# Patient Record
Sex: MALE | Race: WHITE | NOT HISPANIC OR LATINO | Employment: OTHER | URBAN - METROPOLITAN AREA
[De-identification: names, ages, dates, MRNs, and addresses within clinical notes are randomized per-mention and may not be internally consistent; named-entity substitution may affect disease eponyms.]

---

## 2017-04-11 ENCOUNTER — ALLSCRIPTS OFFICE VISIT (OUTPATIENT)
Dept: OTHER | Facility: OTHER | Age: 82
End: 2017-04-11

## 2017-06-19 ENCOUNTER — ALLSCRIPTS OFFICE VISIT (OUTPATIENT)
Dept: OTHER | Facility: OTHER | Age: 82
End: 2017-06-19

## 2017-10-05 ENCOUNTER — ALLSCRIPTS OFFICE VISIT (OUTPATIENT)
Dept: OTHER | Facility: OTHER | Age: 82
End: 2017-10-05

## 2017-10-05 ENCOUNTER — GENERIC CONVERSION - ENCOUNTER (OUTPATIENT)
Dept: OTHER | Facility: OTHER | Age: 82
End: 2017-10-05

## 2017-10-05 DIAGNOSIS — E78.5 HYPERLIPIDEMIA: ICD-10-CM

## 2017-10-05 DIAGNOSIS — C67.9 MALIGNANT NEOPLASM OF BLADDER (HCC): ICD-10-CM

## 2018-01-13 VITALS
HEIGHT: 70 IN | DIASTOLIC BLOOD PRESSURE: 80 MMHG | SYSTOLIC BLOOD PRESSURE: 128 MMHG | TEMPERATURE: 98.2 F | RESPIRATION RATE: 20 BRPM | BODY MASS INDEX: 32.53 KG/M2 | HEART RATE: 73 BPM | WEIGHT: 227.25 LBS

## 2018-01-13 VITALS
SYSTOLIC BLOOD PRESSURE: 136 MMHG | TEMPERATURE: 98.7 F | RESPIRATION RATE: 22 BRPM | HEART RATE: 78 BPM | HEIGHT: 70 IN | DIASTOLIC BLOOD PRESSURE: 80 MMHG | BODY MASS INDEX: 32.99 KG/M2 | WEIGHT: 230.44 LBS

## 2018-01-13 NOTE — RESULT NOTES
Verified Results  (1) COMPREHENSIVE METABOLIC PANEL [id number] 77:06XX Rick Patel Order Number: [id number]     Test Name Result Flag Reference   GLUCOSE,RANDM 87 mg/dL     If the patient is fasting, the ADA then defines impaired fasting glucose as > 100 mg/dL and diabetes as > or equal to 123 mg/dL  SODIUM 142 mmol/L  136-145   POTASSIUM 4 7 mmol/L  3 5-5 3   CHLORIDE 105 mmol/L  100-108   CARBON DIOXIDE 30 mmol/L  21-32   ANION GAP (CALC) 7 mmol/L  4-13   BLOOD UREA NITROGEN 14 mg/dL  5-25   CREATININE 1 21 mg/dL  0 60-1 30   Standardized to IDMS reference method   CALCIUM 9 5 mg/dL  8 3-10 1   BILI, TOTAL 1 36 mg/dL H 0 20-1 00   ALK PHOSPHATAS 63 U/L     ALT (SGPT) 32 U/L  12-78   AST(SGOT) 17 U/L  5-45   ALBUMIN 3 8 g/dL  3 5-5 0   TOTAL PROTEIN 7 4 g/dL  6 4-8 2   eGFR Non-African American 56 9 ml/min/1 73sq m     - Patient Instructions: This is a fasting blood test  Water,black tea or black  coffee only after 9:00pm the night before test Drink 2 glasses of water the morning of test   National Kidney Disease Education Program recommendations are as follows:  GFR calculation is accurate only with a steady state creatinine  Chronic Kidney disease less than 60 ml/min/1 73 sq  meters  Kidney failure less than 15 ml/min/1 73 sq  meters  (1) LDL,DIRECT [id number] 10:35AM Rick Patel Order Number: [id number]     Test Name Result Flag Reference   LDL, DIRECT 153 mg/dl H 0-100   - Patient Instructions: This is a fasting blood test  Water,black tea or black  coffee only after 9:00pm the night before test   Drink 2 glasses of water the morning of test     - Patient Instructions:  This is a fasting blood test  Water,black tea or black  coffee only after 9:00pm the night before test Drink 2 glasses of water the morning of test   LDL Cholesterol:        Optimal          <100 mg/dl        Near Optimal     100-129 mg/dl        Above Optimal          Borderline High 130-159 mg/dl          High              160-189 mg/dl          Very High        >189 mg/dl     (1) TRIGLYCERIDE [id number] 10:35AM Leanna Majestic Order Number: [id number]     Test Name Result Flag Reference   TRIGLYCERIDES 101 mg/dL  <=150   Specimen collection should occur prior to N-Acetylcysteine or Metamizole administration due to the potential for falsely depressed results  - Patient Instructions:  This is a fasting blood test  Water,black tea or black  coffee only after 9:00pm the night before test Drink 2 glasses of water the morning of test   Triglyceride:         Normal              <150 mg/dl       Borderline High    150-199 mg/dl       High               200-499 mg/dl       Very High          >499 mg/dl

## 2018-01-13 NOTE — RESULT NOTES
Verified Results  * MRI BRAIN W WO CONTRAST [id number] 01:18PM Jw Blanc Order Number: [id number]     Test Name Result Flag Reference   MRI BRAIN W [address] (Report)     This is a summary report  The complete report is available in the patient's medical record  If you cannot access the medical record, please contact the sending organization for a detailed fax or copy  MRI BRAIN WITH AND WITHOUT CONTRAST     INDICATION: Forgetful     COMPARISON: None  TECHNIQUE:   Sagittal T1, axial T2, axial FLAIR, axial T1, axial [place], axial diffusion  Sagittal, axial and coronal T1 postcontrast  Axial BRAVO post contrast     IV Contrast: Gadobutrol injection (SINGLE-DOSE) SOLN 10 mL Note: (SINGLE DOSE/MULTI DOSE) information refers to the container from which the contrast was acquired  Contrast was injected one time intravenously without immediate complication  IMAGE QUALITY:  Diagnostic  FINDINGS:     BRAIN PARENCHYMA: There is no discrete mass, mass effect or midline shift  No acute abnormal white matter signal identified  Very minor degree of chronic small vessel disease  Brainstem and cerebellum demonstrate normal signal  There is no    intracranial hemorrhage  There is no evidence of acute infarction and diffusion imaging is unremarkable  Postcontrast imaging of the brain demonstrates no abnormal enhancement  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

## 2018-01-18 NOTE — RESULT NOTES
Verified Results  (1) RPR [id number] 08:38AM Claudene Grippe Order Number: [id number]     Test Name Result Flag Reference   RPR Non-Reactive  Non-Reactive     (1) RHEUMATOID FACTOR SCREEN [id number] 08:38AM Claudene Grippe Order Number: [id number]     Test Name Result Flag Reference   RHEUMATOID FACTOR Negative  Negative     (1) HA SCREEN W/REFLEX TO TITER/PATTERN [id number] 08:38AM Claudene Grippe Order Number: [id number]     Test Name Result Flag Reference   HA SCREEN   Negative  Negative

## 2018-01-18 NOTE — RESULT NOTES
Verified Results  (1) CBC/PLT/DIFF [id number] 08:38AM Fe Griffin Order Number: [id number]     Order Number: [id number]     Test Name Result Flag Reference   WBC COUNT 7 68 Thousand/uL  4 31-10 16   RBC COUNT 4 89 Million/uL  3 88-5 62   HEMOGLOBIN 15 2 g/dL  12 0-17 0   HEMATOCRIT 45 8 %  36 5-49 3   MCV 94 fL  82-98   MCH 31 1 pg  26 8-34 3   MCHC 33 2 g/dL  31 4-37 4   RDW 12 6 %  11 6-15 1   MPV 11 9 fL  8 9-12 7   PLATELET COUNT 218 Thousands/uL  149-390   NEUTROPHILS RELATIVE PERCENT 56 %  43-75   LYMPHOCYTES RELATIVE PERCENT 31 %  14-44   MONOCYTES RELATIVE PERCENT 10 %  4-12   EOSINOPHILS RELATIVE PERCENT 2 %  0-6   BASOPHILS RELATIVE PERCENT 1 %  0-1   NEUTROPHILS ABSOLUTE COUNT 4 32 Thousands/µL  1 85-7 62   LYMPHOCYTES ABSOLUTE COUNT 2 36 Thousands/µL  0 60-4 47   MONOCYTES ABSOLUTE COUNT 0 80 Thousand/µL  0 17-1 22   EOSINOPHILS ABSOLUTE COUNT 0 15 Thousand/µL  0 00-0 61   BASOPHILS ABSOLUTE COUNT 0 05 Thousands/µL  0 00-0 10     (1) COMPREHENSIVE METABOLIC PANEL [id number] 60:13GT Fe Griffin Order Number: [id number]      National Kidney Disease Education Program recommendations are as follows:  GFR calculation is accurate only with a steady state creatinine  Chronic Kidney disease less than 60 ml/min/1 73 sq  meters  Kidney failure less than 15 ml/min/1 73 sq  meters  Test Name Result Flag Reference   GLUCOSE,RANDM 92 mg/dL     If the patient is fasting, the ADA then defines impaired fasting glucose as > 100 mg/dL and diabetes as > or equal to 123 mg/dL     SODIUM 140 mmol/L  136-145   POTASSIUM 5 1 mmol/L  3 5-5 3   CHLORIDE 102 mmol/L  100-108   CARBON DIOXIDE 29 mmol/L  21-32   ANION GAP (CALC) 9 mmol/L  4-13   BLOOD UREA NITROGEN 17 mg/dL  5-25   CREATININE 1 14 mg/dL  0 60-1 30   Standardized to IDMS reference method   CALCIUM 8 9 mg/dL  8 3-10 1   BILI, TOTAL 1 01 mg/dL H 0 20-1 00   ALK PHOSPHATAS 63 U/L     ALT (SGPT) 35 U/L  12-78   AST(SGOT) 18 U/L  5-45 ALBUMIN 2 8 g/dL L 3 5-5 0   TOTAL PROTEIN 7 0 g/dL  6 4-8 2   eGFR Non-African American      >60 0 ml/min/1 73sq m     (1) LIPID PANEL FASTING W DIRECT LDL REFLEX [id number] 08:38AM Rick Patel Order Number: [id number]      Triglyceride:         Normal              <150 mg/dl       Borderline High    150-199 mg/dl       High               200-499 mg/dl       Very High          >499 mg/dl  Cholesterol:         Desirable        <200 mg/dl      Borderline High  200-239 mg/dl      High             >239 mg/dl  HDL Cholesterol:        High    >59 mg/dL      Low     <41 mg/dL  LDL Cholesterol:        Optimal          <100 mg/dl         Near Optimal     100-129 mg/dl        Above Optimal          Borderline High   130-159 mg/dl          High              160-189 mg/dl          Very High        >189 mg/dl  LDL CALCULATED:    This screening LDL is a calculated result  It does not have the accuracy of the Direct Measured LDL in the monitoring of patients with hyperlipidemia and/or statin therapy  Direct Measure LDL (THE743) must be ordered separately in these patients  Test Name Result Flag Reference   CHOLESTEROL 230 mg/dL H    LDL CHOLESTEROL CALCULATED 165 mg/dL H 0-100   TRIGLYCERIDES 82 mg/dL  <=150   HDL,DIRECT 49 mg/dL  40-60     (1) MAGNESIUM [id number] 08:38AM Rick Patel Order Number: [id number]     Test Name Result Flag Reference   MAGNESIUM 2 1 mg/dL  1 6-2 6     (1) SED RATE [id number] 08:38AM Rick Patel Order Number: [id number]     Test Name Result Flag Reference   SED RATE 4 mm/hour  0-10     (1) TSH [id number] 08:38AM Rick Patel Order Number: [id number]    Patients undergoing fluorescein dye angiography may retain small amounts of fluorescein in the body for 48-72 hours post procedure  Samples containing fluorescein can produce falsely depressed TSH values  If the patient had this procedure,a specimen should be resubmitted post fluorescein clearance       Test Name Result Flag Reference   TSH 1 667 uIU/mL  0 358-3 740     (1) URINALYSIS w URINE C/S REFLEX (will reflex a microscopy if leukocytes, occult blood, or nitrites are not within normal limits) [id number] 08:38AM Eric Osorio Order Number: [id number]     Test Name Result Flag Reference   COLOR Yellow     CLARITY Clear     PH UA 6 0  4 5-8 0   LEUKOCYTE ESTERASE UA Negative  Negative   NITRITE UA Negative  Negative   PROTEIN UA Negative mg/dl  Negative   GLUCOSE UA Negative mg/dl  Negative   KETONES UA Negative mg/dl  Negative   UROBILINOGEN UA 0 2 E U /dl  0 2, 1 0 E U /dl   BILIRUBIN UA Negative  Negative   BLOOD UA Negative  Negative, Trace-Intact   SPECIFIC GRAVITY UA 1 015  1 003-1 030     (1) PSA (SCREEN) (Dx V76 44 Screen for Prostate Cancer) [id number] 08:38AM Eric Osorio Order Number: [id number]     Order Number: [id number]     Test Name Result Flag Reference   PROSTATE SPECIFIC ANTIGEN 1 2 ng/mL  0 0-4 0     (1) VITAMIN D 25-HYDROXY [id number] 08:38AM Eric Osorio Order Number: [id number]    TW Order Number: [id number]     Test Name Result Flag Reference   VIT D 25-HYDROX 13 7 ng/mL L 30 0-100 0     (1) T3 TOTAL [id number] 08:38AM Eric Osorio Order Number: [id number]     Order Number: [id number]     Test Name Result Flag Reference   T3 0 9 ng/mL  0 6-1 8     (1) T4, FREE [id number] 08:38AM Eric Osorio Order Number: [id number]     Test Name Result Flag Reference   T4,FREE 0 90 ng/dL  0 76-1 46     (1) FOLATE [id number] 08:38AM Eric Osorio Order Number: [id number]     Test Name Result Flag Reference   FOLATE 5 3 ng/mL  3 1-17 5     (1) VITAMIN B12 [id number] 08:38AM Eric Osorio Order Number: [id number]     Test Name Result Flag Reference   VITAMIN B12 290 pg/mL  100-900

## 2018-01-22 VITALS
DIASTOLIC BLOOD PRESSURE: 64 MMHG | HEART RATE: 80 BPM | HEIGHT: 70 IN | BODY MASS INDEX: 33.56 KG/M2 | RESPIRATION RATE: 20 BRPM | TEMPERATURE: 99.2 F | SYSTOLIC BLOOD PRESSURE: 118 MMHG | WEIGHT: 234.44 LBS

## 2018-01-23 NOTE — PROGRESS NOTES
Assessment    1  Medicare annual wellness visit, initial (V70 0) (Z00 00)   2  Dementia (294 20) (F03 90)   3  Generalized anxiety disorder (300 02) (F41 1)   4  Hyperlipidemia (272 4) (E78 5)   5  Malignant tumor of urinary bladder (188 9) (C67 9)    Plan  Dementia    · Donepezil HCl - 10 MG Oral Tablet; Take 1 tablet daily  Health Maintenance    · Fluzone High-Dose 0 5 ML Intramuscular Suspension Prefilled Syringe;  INJECT 0 5  ML Intramuscular; To Be Done: 24BAI6532  Hyperlipidemia    · (1) COMPREHENSIVE METABOLIC PANEL; Status:Active; Requested SFF:[id number];    · (1) LIPID PANEL FASTING W DIRECT LDL REFLEX; Status:Active; Requested  JFZ:[id number];    · (1) TSH; Status:Active; Requested EJL:[id number];    · Follow-up visit in 6 months Evaluation and Treatment  Follow-up  Status: Hold For -  Scheduling  Requested for: 03GZO1527   · Eat a low fat and low cholesterol diet ; Status:Complete;   Done: 55FWT6366  Malignant tumor of urinary bladder    · (1) CBC/PLT/DIFF; Status:Active; Requested DEK:[id number];     Discussion/Summary  Impression: Initial Annual Wellness Visit  Cardiovascular screening and counseling: screening is current  Diabetes screening and counseling: screening is current  Colorectal cancer screening and counseling: screening is current  Osteoporosis screening and counseling: the risks and benefits of screening were discussed  Abdominal aortic aneurysm screening and counseling: the risks and benefits of screening were discussed  Glaucoma screening and counseling: screening is current  HIV screening and counseling: screening not indicated  Immunizations: influenza vaccine is up to date this year, pneumococcal vaccination status is unknown, Zostavax vaccination status is unknown, Td vaccination status is unknown and Tdap vaccination status is unknown  Advance Directive Planning: not complete, paperwork and instructions were given to the patient   Advice and education were given regarding alcohol use, fall risk reduction, increasing physical activity, nutrition (non-diabetic), seat belt use and weight loss  He was referred to none  Medical Equipment/Suppliers: none  Patient Discussion: plan discussed with the patient, plan discussed with the patient's family, follow-up visit needed in one year, follow-up as needed  History of Present Illness  WM presents for Medicare Wellness  The patient is being seen for the initial annual wellness visit  Medicare Screening and Risk Factors   Hospitalizations: no previous hospitalizations  Once per lifetime medicare screening tests: ECG  Medicare Screening Tests Risk Questions   Abdominal aortic aneurysm risk assessment: over 72years of age  Osteoporosis risk assessment:  and over 48years of age  HIV risk assessment: none indicated  Drug and Alcohol Use: The patient has never smoked cigarettes  The patient reports drinking 4 drinks per day  Alcohol concern:   The patient has no concerns about alcohol abuse  He has never used illicit drugs  Diet and Physical Activity: Current diet includes well balanced meals, frequent junk food, 1 servings of meat per day and 4 cups of coffee per day  The patient does not exercise  Mood Disorder and Cognitive Impairment Screening: PHQ-9 Depression Scale   Over the past 2 weeks, how often have you been bothered by the following problems? 1 ) Little interest or pleasure in doing things? Nearly every day  2 ) Feeling down, depressed or hopeless? Several days  3 ) Trouble falling asleep or sleeping too much? Nearly every day  5 ) Poor appetite or overeating? Several days  6 ) Feeling bad about yourself, or that you are a failure, or have let yourself or your family down? Not at all    7 ) Trouble concentrating on things, such as reading a newspaper or watching television? Nearly every day     8 ) Moving or speaking so slowly that other people could have noticed, or the opposite, moving or speaking faster than usual? Half the days or more  TOTAL SCORE: 17, severity of depression is moderately severe  How difficult have these problems made it for you to do your work, take care of things at home, or get along with people? Somewhat difficult  Depression screening  mild to moderate symptoms  He denies feeling down, depressed, or hopeless over the past two weeks  He denies feeling little interest or pleasure in doing things over the past two weeks  Cognitive impairment screening: difficulty learning/retaining new information, difficulty handling complex tasks, denies difficulty with reasoning, denies difficulty with spatial ability and orientation, denies difficulty with language and denies difficulty with behavior  Functional Ability/Level of Safety: Hearing is slightly decreased  He reports hearing difficulties  He does not use a hearing aid  The patient is currently able to do activities of daily living with limitations and unable to drive  Activities of daily living details: transportation help needed, needs help shopping, meal preparation help needed, needs help doing housework, needs help doing laundry, needs help managing medications and needs help managing money, but does not need help using the phone  Fall risk factors: The patient fell 0 times in the past 12 months  Home safety risk factors:  no unfamiliar surroundings, no loose rugs, no poor household lighting, no uneven floors, no household clutter, grab bars in the bathroom and handrails on the stairs  Advance Directives: Advance directives: no living will, no durable power of  for health care directives and no advance directives  Co-Managers and Medical Equipment/Suppliers: See Patient Care Team      Active Problems    1  Alcohol use (V49 89) (Z78 9)   2  Bifascicular block (426 53) (I45 2)   3  Dementia (294 20) (F03 90)   4  Encounter for prostate cancer screening (V76 44) (Z12 5)   5   Flu vaccine need (V04 81) (Z23)   6  Former smoker (V15 82) (R77 569)   7  Generalized anxiety disorder (300 02) (F41 1)   8  Generalized osteoarthritis (715 00) (M15 9)   9  Hyperlipidemia (272 4) (E78 5)   10  Left anterior fascicular block (426 2) (I44 4)   11  Malignant tumor of urinary bladder (188 9) (C67 9)   12  Medicare annual wellness visit, initial (V70 0) (Z00 00)   13  Palpitations (785 1) (R00 2)   14  Right bundle branch block (RBBB) (426 4) (I45 10)   15  Sleep disturbances (780 50) (G47 9)   16  Spinal stenosis (724 00) (M48 00)   17  Visit for screening (V82 9) (Z13 9)    Past Medical History    1  History of pulmonary embolism (V12 55) (Z86 711)   2  History of varicose veins (V12 59) (Z86 79)   3  History of Left leg swelling (729 81) (M79 89)    Surgical History    1  History of Appendectomy   2  History of Cataract Surgery   3  History of Cystoscopy With Biopsy   4  History of Tonsillectomy With Adenoidectomy   5  History of Varicose Vein Ligation    Family History  Father    1  Family history of Malignant neoplasm of kidney, unspecified laterality  Brother    2  Family history of coronary artery disease (V17 3) (Z82 49)   3  Family history of diabetes mellitus (V18 0) (Z83 3)    Social History    · Alcohol drinker (V49 89) (Z78 9)   · Alcohol use (V49 89) (Z78 9)   · Former smoker (G59 62) (Z87 891)   ·    · Previous  service   · Retired   · Two children    Current Meds   1  Donepezil HCl - 5 MG Oral Tablet; TAKE 1 TABLET AT BEDTIME; Therapy: 80UQB8188 to (Evaluate:14Jun2018)  Requested for: 99DSL3609; Last   Rx:19Jun2017 Ordered   2  Ibuprofen 600 MG Oral Tablet; TAKE 1 TABLET Every 6 hours PRN; Therapy: 47LIX0414 to (Evaluate:06Jun2017)  Requested for: 39YRX9419; Last   Rx:16Xpz9410 Ordered   3  PARoxetine HCl - 20 MG Oral Tablet; TAKE 1 TABLET DAILY; Therapy: 11Eah6838 to (Evaluate:06Apr2018)  Requested for: 28Nmy8807; Last   Rx:24Rcb0667 Ordered    Allergies    1   Cipro TABS    Immunizations  Influenza --- Jessica Ruffin-Dec-2016   PCV --- Jessica Ruffin-Mar-2016   Tdap --- Series1: 02-Mar-2016     Vitals  Signs   Recorded: 45ODT9682 01:21PM   Temperature: 99 2 F  Heart Rate: 80  Respiration: 20  Systolic: 328  Diastolic: 64  Height: 5 ft 10 in  Weight: 234 lb 7 04 oz  BMI Calculated: 33 64  BSA Calculated: 2 24    Future Appointments    Date/Time Provider Specialty Site   2018 01:00 PM DUGLAS Ernandez   Internal Medicine [facility] MED     Signatures   Electronically signed by : DUGLAS Cote ; Oct  5 2017  2:01PM EST                       (Author)

## 2018-04-04 PROBLEM — F03.90 DEMENTIA (HCC): Status: ACTIVE | Noted: 2017-06-19

## 2018-05-31 ENCOUNTER — OFFICE VISIT (OUTPATIENT)
Dept: FAMILY MEDICINE CLINIC | Facility: CLINIC | Age: 83
End: 2018-05-31
Payer: COMMERCIAL

## 2018-05-31 VITALS
TEMPERATURE: 99.4 F | HEART RATE: 87 BPM | DIASTOLIC BLOOD PRESSURE: 66 MMHG | BODY MASS INDEX: 31.64 KG/M2 | HEIGHT: 70 IN | OXYGEN SATURATION: 96 % | SYSTOLIC BLOOD PRESSURE: 118 MMHG | WEIGHT: 221 LBS | RESPIRATION RATE: 20 BRPM

## 2018-05-31 DIAGNOSIS — R52 PAIN: Primary | ICD-10-CM

## 2018-05-31 DIAGNOSIS — M15.9 GENERALIZED OSTEOARTHRITIS: ICD-10-CM

## 2018-05-31 DIAGNOSIS — E78.2 MIXED HYPERLIPIDEMIA: ICD-10-CM

## 2018-05-31 DIAGNOSIS — C67.9 MALIGNANT NEOPLASM OF URINARY BLADDER, UNSPECIFIED SITE (HCC): ICD-10-CM

## 2018-05-31 DIAGNOSIS — F41.1 GENERALIZED ANXIETY DISORDER: ICD-10-CM

## 2018-05-31 DIAGNOSIS — R00.2 PALPITATIONS: ICD-10-CM

## 2018-05-31 DIAGNOSIS — F03.90 DEMENTIA WITHOUT BEHAVIORAL DISTURBANCE, UNSPECIFIED DEMENTIA TYPE (HCC): ICD-10-CM

## 2018-05-31 DIAGNOSIS — R05.9 COUGH: ICD-10-CM

## 2018-05-31 DIAGNOSIS — R06.00 DOE (DYSPNEA ON EXERTION): ICD-10-CM

## 2018-05-31 PROCEDURE — 93000 ELECTROCARDIOGRAM COMPLETE: CPT | Performed by: INTERNAL MEDICINE

## 2018-05-31 PROCEDURE — 99214 OFFICE O/P EST MOD 30 MIN: CPT | Performed by: INTERNAL MEDICINE

## 2018-05-31 RX ORDER — GUAIFENESIN 600 MG
600 TABLET, EXTENDED RELEASE 12 HR ORAL EVERY 12 HOURS SCHEDULED
Qty: 20 TABLET | Refills: 0 | Status: SHIPPED | OUTPATIENT
Start: 2018-05-31 | End: 2018-06-21 | Stop reason: ALTCHOICE

## 2018-05-31 RX ORDER — GUAIFENESIN AND CODEINE PHOSPHATE 100; 10 MG/5ML; MG/5ML
5 SOLUTION ORAL 3 TIMES DAILY PRN
Qty: 120 ML | Refills: 0 | Status: SHIPPED | OUTPATIENT
Start: 2018-05-31 | End: 2018-06-21 | Stop reason: ALTCHOICE

## 2018-05-31 RX ORDER — AMOXICILLIN AND CLAVULANATE POTASSIUM 875; 125 MG/1; MG/1
1 TABLET, FILM COATED ORAL EVERY 12 HOURS SCHEDULED
Qty: 20 TABLET | Refills: 0 | Status: SHIPPED | OUTPATIENT
Start: 2018-05-31 | End: 2018-06-10

## 2018-05-31 RX ORDER — IBUPROFEN 600 MG/1
600 TABLET ORAL EVERY 6 HOURS PRN
Qty: 90 TABLET | Refills: 0 | Status: SHIPPED | OUTPATIENT
Start: 2018-05-31

## 2018-05-31 NOTE — PATIENT INSTRUCTIONS

## 2018-05-31 NOTE — PROGRESS NOTES
Assessment/Plan:    No problem-specific Assessment & Plan notes found for this encounter  Diagnoses and all orders for this visit:    Pain  -     ibuprofen (MOTRIN) 600 mg tablet; Take 1 tablet (600 mg total) by mouth every 6 (six) hours as needed for mild pain    Mixed hyperlipidemia  -     Comprehensive metabolic panel; Future  -     Lipid Panel with Direct LDL reflex; Future  -     TSH, 3rd generation; Future    Dementia without behavioral disturbance, unspecified dementia type  -     CBC and differential; Future    Malignant neoplasm of urinary bladder, unspecified site (HCC)    Generalized osteoarthritis    Generalized anxiety disorder    Palpitations    Cough    SUH (dyspnea on exertion)  -     XR chest pa & lateral; Future  -     Pulse oximetry, spot  -     guaiFENesin (MUCINEX) 600 mg 12 hr tablet; Take 1 tablet (600 mg total) by mouth every 12 (twelve) hours  -     guaifenesin-codeine (GUAIFENESIN AC) 100-10 MG/5ML liquid; Take 5 mL by mouth 3 (three) times a day as needed for cough  -     POCT ECG  -     amoxicillin-clavulanate (AUGMENTIN) 875-125 mg per tablet; Take 1 tablet by mouth every 12 (twelve) hours for 10 days    Other orders  -     Cancel: Vitamin D 1,25 dihydroxy; Future      A/P: Clinically looks ok  PE not overly impressive  RA pulse ox at rest and with activity was 96%  EKG abnormal, but with a RBBB and no acute changes  No s/s of CHF  Need to R/O CAP and will check CXR and labs  ??sinusitis  Empiric abx  No reflux or PND  Otherwise doing well  Recommend he cut back the ETOH  Continue current treatment otherwise  Needs a second pneumonia vaccine, but will hold off til next visit  RTC three weeks for re-eval      Subjective:      Patient ID: Farzana Caceres is a 80 y o  male   RTC with his relative for f/u hyperlipidemia, dementia, etc  Doing ok, but reports four weeks of SUH and a productive yellow cough  No associated CP, palpitations, orthopnea, PND, or edema   Non smoker, but years of second hand smoke exposure  Otherwise, remains active w/o difficulty and one fall, out of bed, but none during walking  Dementia no worse and no behavioral or safety concerns per the caretaker  GUY is controlled  Due for labs  Continues to drink daily  Cough   Associated symptoms include shortness of breath  Pertinent negatives include no chest pain, chills, ear pain, fever, headaches, myalgias, postnasal drip, rhinorrhea, sore throat or wheezing  The following portions of the patient's history were reviewed and updated as appropriate:   He  has a past medical history of Dementia and Pulmonary embolism ([facility] [address] )  He   Patient Active Problem List    Diagnosis Date Noted    Dementia 06/19/2017    Bifascicular block 03/02/2016    Generalized anxiety disorder 03/02/2016    Generalized osteoarthritis 03/02/2016    Hyperlipidemia 03/02/2016    Malignant tumor of urinary bladder ([facility] [address] ) 03/02/2016    Palpitations 03/02/2016    Right bundle branch block (RBBB) 03/02/2016    Sleep disturbances 03/02/2016    Spinal stenosis 03/02/2016     He  has a past surgical history that includes Appendectomy; Cataract extraction; Cystoscopy; TONSILECTOMY AND ADNOIDECTOMY; and Varicose vein surgery  His family history includes Coronary artery disease in his brother; Diabetes in his brother; Kidney cancer in his father  He  reports that he has quit smoking  He has never used smokeless tobacco  He reports that he drinks about 4 2 oz of alcohol per week   He reports that he does not use drugs    Current Outpatient Prescriptions   Medication Sig Dispense Refill    ibuprofen (MOTRIN) 600 mg tablet Take 1 tablet (600 mg total) by mouth every 6 (six) hours as needed for mild pain 90 tablet 0    PARoxetine (PAXIL) 20 mg tablet Take 1 tablet by mouth daily      amoxicillin-clavulanate (AUGMENTIN) 875-125 mg per tablet Take 1 tablet by mouth every 12 (twelve) hours for 10 days 20 tablet 0    guaiFENesin (MUCINEX) 600 mg 12 hr tablet Take 1 tablet (600 mg total) by mouth every 12 (twelve) hours 20 tablet 0    guaifenesin-codeine (GUAIFENESIN AC) 100-10 MG/5ML liquid Take 5 mL by mouth 3 (three) times a day as needed for cough 120 mL 0     No current facility-administered medications for this visit  Current Outpatient Prescriptions on File Prior to Visit   Medication Sig    PARoxetine (PAXIL) 20 mg tablet Take 1 tablet by mouth daily    [DISCONTINUED] ibuprofen (MOTRIN) 600 mg tablet Take 1 tablet by mouth every 6 (six) hours as needed    [DISCONTINUED] donepezil (ARICEPT) 10 mg tablet Take 1 tablet by mouth daily     No current facility-administered medications on file prior to visit  He is allergic to ciprofloxacin       Review of Systems   Constitutional: Negative for activity change, chills, diaphoresis, fatigue and fever  HENT: Negative for congestion, ear pain, facial swelling, postnasal drip, rhinorrhea, sinus pain, sinus pressure, sore throat and trouble swallowing  Eyes: Negative for visual disturbance  Respiratory: Positive for cough and shortness of breath  Negative for chest tightness and wheezing  Cardiovascular: Negative for chest pain, palpitations and leg swelling  Gastrointestinal: Negative for abdominal pain, constipation, diarrhea, nausea and vomiting  Endocrine: Negative for cold intolerance and heat intolerance  Genitourinary: Negative for difficulty urinating, dysuria and frequency  Musculoskeletal: Negative for arthralgias, gait problem and myalgias  Neurological: Negative for dizziness, tremors, seizures, syncope, speech difficulty, weakness, light-headedness and headaches  Memory issues  Psychiatric/Behavioral: Positive for sleep disturbance  Negative for agitation, behavioral problems, confusion, dysphoric mood and hallucinations  The patient is nervous/anxious            Objective:      /66 (BP Location: Left arm, Patient Position: Sitting, Cuff Size: Large)   Pulse 87   Temp 99 4 °F (37 4 °C) (Tympanic)   Resp 20   Ht 5' 10" (1 778 m)   Wt 100 kg (221 lb)   SpO2 96% Comment: post ambulation  BMI 31 71 kg/m²          Physical Exam   Constitutional: He is oriented to person, place, and time  He appears well-developed and well-nourished  No distress  HENT:   Head: Normocephalic and atraumatic  Right Ear: External ear normal    Left Ear: External ear normal    Mouth/Throat: Oropharynx is clear and moist  No oropharyngeal exudate  Eyes: Conjunctivae and EOM are normal  Pupils are equal, round, and reactive to light  Neck: Neck supple  No JVD present  Cardiovascular: Normal rate, regular rhythm and normal heart sounds  No murmur heard  Pulmonary/Chest: Breath sounds normal  No respiratory distress  He has no wheezes  He has no rales  Abdominal: Soft  Bowel sounds are normal  He exhibits no distension  There is no tenderness  Musculoskeletal: He exhibits no edema  Neurological: He is alert and oriented to person, place, and time  No cranial nerve deficit  Coordination normal    Psychiatric: He has a normal mood and affect  His behavior is normal  Judgment and thought content normal    Nursing note and vitals reviewed  Pulse oximetry, spot     Date/Time 5/31/2018 1:54 PM     Performed by  Adin Roberts     Authorized by Adin Roberts       Consent: Verbal consent obtained  Consent given by: patient and guardian  Patient understanding: patient states understanding of the procedure being performed  Patient consent: the patient's understanding of the procedure matches consent given  Patient identity confirmed: verbally with patient     Procedure Details   Procedure Notes: Pulse ox obtained on RA at both rest and with activity and was 96%  Pt and caretaker aware     Patient tolerance: Patient tolerated the procedure well with no immediate complications

## 2018-06-09 ENCOUNTER — TRANSCRIBE ORDERS (OUTPATIENT)
Dept: URGENT CARE | Facility: CLINIC | Age: 83
End: 2018-06-09

## 2018-06-09 ENCOUNTER — APPOINTMENT (OUTPATIENT)
Dept: RADIOLOGY | Facility: CLINIC | Age: 83
End: 2018-06-09
Payer: COMMERCIAL

## 2018-06-09 DIAGNOSIS — R06.00 DOE (DYSPNEA ON EXERTION): ICD-10-CM

## 2018-06-09 PROCEDURE — 71046 X-RAY EXAM CHEST 2 VIEWS: CPT

## 2018-06-11 PROBLEM — B38.2: Status: ACTIVE | Noted: 2018-06-11

## 2018-06-13 ENCOUNTER — TRANSCRIBE ORDERS (OUTPATIENT)
Dept: LAB | Facility: CLINIC | Age: 83
End: 2018-06-13

## 2018-06-13 ENCOUNTER — APPOINTMENT (OUTPATIENT)
Dept: LAB | Facility: CLINIC | Age: 83
End: 2018-06-13
Payer: COMMERCIAL

## 2018-06-13 DIAGNOSIS — F03.90 DEMENTIA WITHOUT BEHAVIORAL DISTURBANCE, UNSPECIFIED DEMENTIA TYPE (HCC): ICD-10-CM

## 2018-06-13 DIAGNOSIS — B38.2: ICD-10-CM

## 2018-06-13 DIAGNOSIS — E78.2 MIXED HYPERLIPIDEMIA: ICD-10-CM

## 2018-06-13 LAB
ALBUMIN SERPL BCP-MCNC: 3.4 G/DL (ref 3.5–5)
ALP SERPL-CCNC: 80 U/L (ref 46–116)
ALT SERPL W P-5'-P-CCNC: 17 U/L (ref 12–78)
ANION GAP SERPL CALCULATED.3IONS-SCNC: 9 MMOL/L (ref 4–13)
AST SERPL W P-5'-P-CCNC: 14 U/L (ref 5–45)
BASOPHILS # BLD AUTO: 0.08 THOUSANDS/ΜL (ref 0–0.1)
BASOPHILS NFR BLD AUTO: 1 % (ref 0–1)
BILIRUB SERPL-MCNC: 0.88 MG/DL (ref 0.2–1)
BUN SERPL-MCNC: 15 MG/DL (ref 5–25)
CALCIUM SERPL-MCNC: 9.3 MG/DL (ref 8.3–10.1)
CHLORIDE SERPL-SCNC: 102 MMOL/L (ref 100–108)
CHOLEST SERPL-MCNC: 181 MG/DL (ref 50–200)
CO2 SERPL-SCNC: 28 MMOL/L (ref 21–32)
CREAT SERPL-MCNC: 1.11 MG/DL (ref 0.6–1.3)
EOSINOPHIL # BLD AUTO: 0.2 THOUSAND/ΜL (ref 0–0.61)
EOSINOPHIL NFR BLD AUTO: 2 % (ref 0–6)
ERYTHROCYTE [DISTWIDTH] IN BLOOD BY AUTOMATED COUNT: 12.4 % (ref 11.6–15.1)
GFR SERPL CREATININE-BSD FRML MDRD: 59 ML/MIN/1.73SQ M
GLUCOSE P FAST SERPL-MCNC: 87 MG/DL (ref 65–99)
HCT VFR BLD AUTO: 46.2 % (ref 36.5–49.3)
HDLC SERPL-MCNC: 34 MG/DL (ref 40–60)
HGB BLD-MCNC: 14.6 G/DL (ref 12–17)
IMM GRANULOCYTES # BLD AUTO: 0.05 THOUSAND/UL (ref 0–0.2)
IMM GRANULOCYTES NFR BLD AUTO: 1 % (ref 0–2)
LDLC SERPL CALC-MCNC: 128 MG/DL (ref 0–100)
LYMPHOCYTES # BLD AUTO: 2.41 THOUSANDS/ΜL (ref 0.6–4.47)
LYMPHOCYTES NFR BLD AUTO: 26 % (ref 14–44)
MCH RBC QN AUTO: 30.3 PG (ref 26.8–34.3)
MCHC RBC AUTO-ENTMCNC: 31.6 G/DL (ref 31.4–37.4)
MCV RBC AUTO: 96 FL (ref 82–98)
MONOCYTES # BLD AUTO: 0.92 THOUSAND/ΜL (ref 0.17–1.22)
MONOCYTES NFR BLD AUTO: 10 % (ref 4–12)
NEUTROPHILS # BLD AUTO: 5.76 THOUSANDS/ΜL (ref 1.85–7.62)
NEUTS SEG NFR BLD AUTO: 60 % (ref 43–75)
NRBC BLD AUTO-RTO: 0 /100 WBCS
PLATELET # BLD AUTO: 329 THOUSANDS/UL (ref 149–390)
PMV BLD AUTO: 11.2 FL (ref 8.9–12.7)
POTASSIUM SERPL-SCNC: 4.7 MMOL/L (ref 3.5–5.3)
PROT SERPL-MCNC: 7.7 G/DL (ref 6.4–8.2)
RBC # BLD AUTO: 4.82 MILLION/UL (ref 3.88–5.62)
SODIUM SERPL-SCNC: 139 MMOL/L (ref 136–145)
TRIGL SERPL-MCNC: 94 MG/DL
TSH SERPL DL<=0.05 MIU/L-ACNC: 1.3 UIU/ML (ref 0.36–3.74)
WBC # BLD AUTO: 9.42 THOUSAND/UL (ref 4.31–10.16)

## 2018-06-13 PROCEDURE — 36415 COLL VENOUS BLD VENIPUNCTURE: CPT

## 2018-06-13 PROCEDURE — 84443 ASSAY THYROID STIM HORMONE: CPT

## 2018-06-13 PROCEDURE — 80061 LIPID PANEL: CPT

## 2018-06-13 PROCEDURE — 80053 COMPREHEN METABOLIC PANEL: CPT

## 2018-06-13 PROCEDURE — 85025 COMPLETE CBC W/AUTO DIFF WBC: CPT

## 2018-06-16 ENCOUNTER — HOSPITAL ENCOUNTER (OUTPATIENT)
Dept: CT IMAGING | Facility: HOSPITAL | Age: 83
Discharge: HOME | End: 2018-06-16
Payer: COMMERCIAL

## 2018-06-16 DIAGNOSIS — B38.2: ICD-10-CM

## 2018-06-16 PROCEDURE — 71260 CT THORAX DX C+: CPT

## 2018-06-16 RX ADMIN — IOHEXOL 85 ML: 350 INJECTION, SOLUTION INTRAVENOUS at 10:02

## 2018-06-21 ENCOUNTER — APPOINTMENT (OUTPATIENT)
Dept: LAB | Facility: HOSPITAL | Age: 83
End: 2018-06-21
Attending: INTERNAL MEDICINE
Payer: COMMERCIAL

## 2018-06-21 ENCOUNTER — OFFICE VISIT (OUTPATIENT)
Dept: FAMILY MEDICINE CLINIC | Facility: CLINIC | Age: 83
End: 2018-06-21
Payer: COMMERCIAL

## 2018-06-21 VITALS
HEIGHT: 70 IN | DIASTOLIC BLOOD PRESSURE: 60 MMHG | SYSTOLIC BLOOD PRESSURE: 116 MMHG | BODY MASS INDEX: 31.21 KG/M2 | WEIGHT: 218 LBS | HEART RATE: 88 BPM | TEMPERATURE: 99.1 F

## 2018-06-21 DIAGNOSIS — Z11.1 SCREENING FOR TUBERCULOSIS: ICD-10-CM

## 2018-06-21 DIAGNOSIS — R91.8 ABNORMAL CT SCAN OF LUNG: ICD-10-CM

## 2018-06-21 DIAGNOSIS — R05.9 COUGH: ICD-10-CM

## 2018-06-21 DIAGNOSIS — R06.00 DOE (DYSPNEA ON EXERTION): ICD-10-CM

## 2018-06-21 DIAGNOSIS — R05.9 COUGH: Primary | ICD-10-CM

## 2018-06-21 PROCEDURE — 99213 OFFICE O/P EST LOW 20 MIN: CPT | Performed by: INTERNAL MEDICINE

## 2018-06-21 PROCEDURE — 36415 COLL VENOUS BLD VENIPUNCTURE: CPT

## 2018-06-21 PROCEDURE — 86480 TB TEST CELL IMMUN MEASURE: CPT

## 2018-06-21 NOTE — PROGRESS NOTES
Assessment/Plan:    No problem-specific Assessment & Plan notes found for this encounter  Diagnoses and all orders for this visit:    Cough  -     TB Skin Test  -     Ambulatory referral to Pulmonology; Future    Abnormal CT scan of lung  -     TB Skin Test  -     Ambulatory referral to Pulmonology; Future    SUH (dyspnea on exertion)  -     TB Skin Test  -     Ambulatory referral to Pulmonology; Future      A/P: Clinically looks well and PE not impressive  Need to be concerned with continue infection, pam TB, and cancer  Will refer to pulmonary and will apply PPD and order AFB smears, etc  Pt told to limit any time outside his house at this time  RTC one month  Subjective:      Patient ID: Harry Orantes is a 80 y o  male  WM RTC with his relative for f/u SUH and cough felt to be due to bronchitis and COPD  Treated empirically with abx and is better  HOWEVER, CXR with cavitary lesion and confirmed with CT scan  Pt denies h/o TB, lung infections, travel outside the [address] or outside of [place] part of the [address] [place] [address],[address]  No fever or chills  No wt loss  No sweats  Totally asymptomatic  Prior cancer history, but no primary lung cancer   in the past and former smoker  Also, history of DVT  Labs were done and acceptable  Still with a productive, but clear cough  The following portions of the patient's history were reviewed and updated as appropriate:   He  has a past medical history of Dementia and Pulmonary embolism ([facility] [address] )    He   Patient Active Problem List    Diagnosis Date Noted    Abnormal CT scan of lung 06/21/2018    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 ([facility] [address] ) 06/11/2018    Dementia 06/19/2017    Bifascicular block 03/02/2016    Generalized anxiety disorder 03/02/2016    Generalized osteoarthritis 03/02/2016    Hyperlipidemia 03/02/2016    Malignant tumor of urinary bladder ([facility] [address] ) 03/02/2016    Palpitations 03/02/2016    Right bundle branch block (RBBB) 03/02/2016    Sleep disturbances 03/02/2016    Spinal stenosis 03/02/2016     He  has a past surgical history that includes Appendectomy; Cataract extraction; Cystoscopy; TONSILECTOMY AND ADNOIDECTOMY; and Varicose vein surgery  His family history includes Coronary artery disease in his brother; Diabetes in his brother; Kidney cancer in his father  He  reports that he has quit smoking  He has never used smokeless tobacco  He reports that he drinks about 4 2 oz of alcohol per week   He reports that he does not use drugs  Current Outpatient Prescriptions   Medication Sig Dispense Refill    ibuprofen (MOTRIN) 600 mg tablet Take 1 tablet (600 mg total) by mouth every 6 (six) hours as needed for mild pain 90 tablet 0    PARoxetine (PAXIL) 20 mg tablet Take 1 tablet by mouth daily       No current facility-administered medications for this visit  Current Outpatient Prescriptions on File Prior to Visit   Medication Sig    ibuprofen (MOTRIN) 600 mg tablet Take 1 tablet (600 mg total) by mouth every 6 (six) hours as needed for mild pain    PARoxetine (PAXIL) 20 mg tablet Take 1 tablet by mouth daily    [DISCONTINUED] guaiFENesin (MUCINEX) 600 mg 12 hr tablet Take 1 tablet (600 mg total) by mouth every 12 (twelve) hours    [DISCONTINUED] guaifenesin-codeine (GUAIFENESIN AC) 100-10 MG/5ML liquid Take 5 mL by mouth 3 (three) times a day as needed for cough     No current facility-administered medications on file prior to visit  He is allergic to ciprofloxacin       Review of Systems   Constitutional: Negative for activity change, chills, diaphoresis, fatigue and fever  HENT: Negative for congestion  Respiratory: Positive for cough  Negative for chest tightness, shortness of breath and wheezing  Cardiovascular: Negative for chest pain, palpitations and leg swelling  Gastrointestinal: Negative for abdominal pain, constipation, diarrhea, nausea and vomiting  Genitourinary: Negative for difficulty urinating, dysuria and frequency  Musculoskeletal: Negative for arthralgias, gait problem and myalgias  Neurological: Negative for light-headedness and headaches  Psychiatric/Behavioral: Negative for confusion  The patient is not nervous/anxious  Objective:      /60   Pulse 88   Temp 99 1 °F (37 3 °C)   Ht 5' 10" (1 778 m)   Wt 98 9 kg (218 lb)   BMI 31 28 kg/m²          Physical Exam   Constitutional: He is oriented to person, place, and time  He appears well-developed and well-nourished  No distress  HENT:   Head: Normocephalic and atraumatic  Mouth/Throat: Oropharynx is clear and moist    Eyes: Conjunctivae and EOM are normal  Pupils are equal, round, and reactive to light  Neck: Neck supple  Cardiovascular: Normal rate, regular rhythm and normal heart sounds  Pulmonary/Chest: Effort normal and breath sounds normal  No respiratory distress  He has no wheezes  Lymphadenopathy:     He has no cervical adenopathy  Neurological: He is alert and oriented to person, place, and time  Psychiatric: He has a normal mood and affect   His behavior is normal  Judgment and thought content normal

## 2018-06-21 NOTE — PATIENT INSTRUCTIONS
Hemoptysis   AMBULATORY CARE:   Hemoptysis  is coughing up blood  This occurs when blood vessels in your airway or lungs weaken or break, and begin to bleed  You may bleed in small or large amounts that appear in your sputum (spit)  Seek care immediately if:   · You have new or worsening chest pain or shortness of breath  · Your bleeding gets worse or you cough up a large amount of blood  · You cannot stop vomiting  · You are so dizzy that you think you may fall or faint  · You have pain or swelling in your legs  · Your legs and arms feel cold or look pale  Contact your healthcare provider if:   · You have new or increasing shortness of breath  · You have a fever  · You lose weight without trying  · You feel more weak and tired than usual     · You have a cough that does not improve or gets worse  · You have questions or concerns about your condition or care  Treatment  may include any of the following:  · Medicines  may be given to fight a bacterial infection or to control a cough  You may also need medicine to slow or stop the bleeding  · Take your medicine as directed  Contact your healthcare provider if you think your medicine is not helping or if you have side effects  Tell him or her if you are allergic to any medicine  Keep a list of the medicines, vitamins, and herbs you take  Include the amounts, and when and why you take them  Bring the list or the pill bottles to follow-up visits  Carry your medicine list with you in case of an emergency  · A saline rinse  of your nose and throat may help decrease or stop the bleeding  · Bronchial artery embolization  is a procedure to inject medicine into your damaged blood vessel  The medicine will help stop the bleeding  · Surgery  may be needed to help stop severe bleeding if other treatments do not work  Surgery may also be done to look for and correct other problems with your airway    Follow up with your healthcare provider in 2 days or as directed: You may need frequent visits to monitor your condition and prevent further blood loss  Write down your questions so you remember to ask them during your visits  Use caution with medicines:  Certain medicines, such as NSAIDs, increase your risk for bleeding  Herbal supplements also increase your risk  Examples of herbal supplements are garlic, gingko, and ginseng  Ask your healthcare provider before you take any over-the-counter medicines  Do not smoke, and do not go to smoky areas:  Smoke may worsen your hemoptysis  Nicotine and other chemicals in cigarettes and cigars can also cause lung damage  Ask your healthcare provider for information if you currently smoke and need help to quit  E-cigarettes or smokeless tobacco still contain nicotine  Talk to your healthcare provider before you use these products  © 2017 [facility] Information is for End User's use only and may not be sold, redistributed or otherwise used for commercial purposes  All illustrations and images included in CareNotes® are the copyrighted property of A D A M [employer] Inc  or Henrry Causey  The above information is an  only  It is not intended as medical advice for individual conditions or treatments  Talk to your doctor, nurse or pharmacist before following any medical regimen to see if it is safe and effective for you

## 2018-06-22 ENCOUNTER — APPOINTMENT (OUTPATIENT)
Dept: LAB | Facility: CLINIC | Age: 83
End: 2018-06-22
Payer: COMMERCIAL

## 2018-06-22 ENCOUNTER — TRANSCRIBE ORDERS (OUTPATIENT)
Dept: LAB | Facility: CLINIC | Age: 83
End: 2018-06-22

## 2018-06-22 DIAGNOSIS — Z11.1 SCREENING FOR TUBERCULOSIS: ICD-10-CM

## 2018-06-22 DIAGNOSIS — R05.9 COUGH: ICD-10-CM

## 2018-06-22 DIAGNOSIS — R91.8 ABNORMAL CT SCAN OF LUNG: ICD-10-CM

## 2018-06-22 DIAGNOSIS — R06.00 DOE (DYSPNEA ON EXERTION): ICD-10-CM

## 2018-06-22 LAB
BACTERIA SPT RESP CULT: NORMAL
GRAM STN SPEC: NORMAL

## 2018-06-22 PROCEDURE — 87205 SMEAR GRAM STAIN: CPT

## 2018-06-25 ENCOUNTER — TELEPHONE (OUTPATIENT)
Dept: FAMILY MEDICINE CLINIC | Facility: CLINIC | Age: 83
End: 2018-06-25

## 2018-06-25 LAB — QUANTIFERON-TB GOLD IN TUBE: NEGATIVE

## 2018-06-25 NOTE — TELEPHONE ENCOUNTER
Call pulmonary, tell them that ID said they don't need to see the pt and that pulmonary is who ID recommends  Call ID and tell them we sent the info to pulmonary and they said that ID needs to see the pt   So who needs to see the pt?

## 2018-06-25 NOTE — TELEPHONE ENCOUNTER
Pts wife called infectious disease to make appt  and they looked through his chart and said he should see pulmonology he might not need to go to ID-

## 2018-06-25 NOTE — TELEPHONE ENCOUNTER
Spoke with pulmonology they are booked- they will review his chart and find a opening for him and call with appt- miners

## 2018-06-25 NOTE — TELEPHONE ENCOUNTER
Lab call and the culture that was taken in 6/22 has a epithelial Cells grater then 10 and so  it cannot be plated for culturing due to the contamination     If you want to do another sputum culture you will need a new script and let the lab know, so she can call the patient    Phone [phone]    Please advise

## 2018-06-28 ENCOUNTER — OFFICE VISIT (OUTPATIENT)
Dept: PULMONOLOGY | Facility: HOSPITAL | Age: 83
End: 2018-06-28
Payer: COMMERCIAL

## 2018-06-28 VITALS
HEART RATE: 64 BPM | SYSTOLIC BLOOD PRESSURE: 110 MMHG | BODY MASS INDEX: 31.21 KG/M2 | HEIGHT: 70 IN | OXYGEN SATURATION: 97 % | DIASTOLIC BLOOD PRESSURE: 60 MMHG | WEIGHT: 218 LBS

## 2018-06-28 DIAGNOSIS — F32.A DEPRESSION, UNSPECIFIED DEPRESSION TYPE: ICD-10-CM

## 2018-06-28 DIAGNOSIS — R05.9 COUGH: ICD-10-CM

## 2018-06-28 DIAGNOSIS — F32.A DEPRESSION, UNSPECIFIED DEPRESSION TYPE: Primary | ICD-10-CM

## 2018-06-28 DIAGNOSIS — R91.8 ABNORMAL CT SCAN OF LUNG: ICD-10-CM

## 2018-06-28 DIAGNOSIS — R06.00 DOE (DYSPNEA ON EXERTION): ICD-10-CM

## 2018-06-28 PROBLEM — B38.2: Status: RESOLVED | Noted: 2018-06-11 | Resolved: 2018-06-28

## 2018-06-28 PROCEDURE — 99205 OFFICE O/P NEW HI 60 MIN: CPT | Performed by: INTERNAL MEDICINE

## 2018-06-28 RX ORDER — PAROXETINE HYDROCHLORIDE 20 MG/1
20 TABLET, FILM COATED ORAL DAILY
Qty: 30 TABLET | Refills: 5 | Status: SHIPPED | OUTPATIENT
Start: 2018-06-28 | End: 2018-06-28 | Stop reason: SDUPTHER

## 2018-06-28 RX ORDER — PAROXETINE HYDROCHLORIDE 20 MG/1
20 TABLET, FILM COATED ORAL DAILY
Qty: 90 TABLET | Refills: 1 | Status: SHIPPED | OUTPATIENT
Start: 2018-06-28

## 2018-06-28 NOTE — ASSESSMENT & PLAN NOTE
Differential diagnosis of the right upper lobe cavitary lesion would be infectious or neoplastic  The patient has no symptoms to suggest infectious process and has completed a course of empiric antibiotics  QuantiFERON gold and PPD were negative,  making TB less likely  He has a remote smoking history and also history of bladder cancer  For further evaluation, he will undergo bronchoscopy  Risks and benefits were discussed  Risks include low oxygen levels, bleeding and injury to lung  There are also anesthesia related risks which will be further explained by the anesthesiologist    Bronchoscopy has been scheduled for Friday, July 6th at 11:00 a m  Gordon Parker Patient and wife were given instructions to remain NPO after midnight the night prior  He will arrive at 10:00 a m  For the procedure

## 2018-06-28 NOTE — TELEPHONE ENCOUNTER
Not the same meds and insurance and govt do not want pt, especially the elderly on this med long term  If sleep is an issue, can try other meds

## 2018-06-28 NOTE — TELEPHONE ENCOUNTER
Pt is asking if he can take Xanax instead of Paxil  He took this in the past, she said he can sleep better

## 2018-06-28 NOTE — PROGRESS NOTES
Pulmonary Outpatient Consultation Note   Dana Ramon 80 y o  male MRN: [id number]  6/28/2018    Assessment/Plan:    Abnormal CT scan of lung   Differential diagnosis of the right upper lobe cavitary lesion would be infectious or neoplastic  The patient has no symptoms to suggest infectious process and has completed a course of empiric antibiotics  QuantiFERON gold and PPD were negative,  making TB less likely  He has a remote smoking history and also history of bladder cancer  For further evaluation, he will undergo bronchoscopy  Risks and benefits were discussed  Risks include low oxygen levels, bleeding and injury to lung  There are also anesthesia related risks which will be further explained by the anesthesiologist    Bronchoscopy has been scheduled for Friday, July 6th at 11:00 a m  Rupa Averyning Patient and wife were given instructions to remain NPO after midnight the night prior  He will arrive at 10:00 a m  For the procedure  Dr Napoleon Tripathi will be performing the procedure  History of Present Illness   HPI:  Dana Ramon is a 80 y o  male who  Is here today for evaluation regarding abnormal chest CT findings  He has no pre-existing lung disease including asthma or emphysema and developed a cough with scant yellow sputum production about a month ago  He was treated with a course of antibiotics and cough suppressant  Though his sputum cleared, he continued to have a cough  Chest x-ray was performed which showed right upper lobe cavitary lesion and this was confirmed with chest CT  He is here today to discuss next steps  He has no fever, chills or night sweats  His weight has been stable  He denies hemoptysis  He denies shortness of breath at rest or with exertion  He has a remote smoking history of 1 pack per day for about 20 years and quit 30 years ago  He has no travel history  He denies history of tuberculosis exposures  He has no recent travel  or sick contacts  He worked in sales     No vomiting, diarrhea or aspiration events  He has a remote history of pulmonary embolism diagnosed in the 1990s at the time of having vein stripping done  No recurrence since  He also has a history of bladder cancer  Review of Systems   Constitutional: Negative for chills, fever and unexpected weight change  HENT: Negative for postnasal drip and sore throat  Eyes: Negative for visual disturbance  Respiratory:        As noted in HPI   Cardiovascular: Negative for chest pain  Gastrointestinal: Negative for abdominal pain, diarrhea and vomiting  Genitourinary: Negative for difficulty urinating  Skin: Negative for rash  Neurological: Negative for headaches  Mild memory loss   Hematological: Negative for adenopathy  Psychiatric/Behavioral: Negative  All other systems reviewed and are negative          Historical Information   Past Medical History:   Diagnosis Date    Dementia     Pulmonary embolism ([facility] [address] ) 1990s     Past Surgical History:   Procedure Laterality Date    APPENDECTOMY      CATARACT EXTRACTION      CYSTOSCOPY      With biopsy, Last Assessed:  3/2/16    TONSILECTOMY AND ADNOIDECTOMY      VARICOSE VEIN SURGERY      Ligation     Family History   Problem Relation Age of Onset    Kidney cancer Father     Coronary artery disease Brother     Diabetes Brother        History   Smoking Status    Former Smoker    Packs/day: 1 00    Years: 20 00    Quit date: 1988   Smokeless Tobacco    Never Used       Meds/Allergies     Current Outpatient Prescriptions:     ibuprofen (MOTRIN) 600 mg tablet, Take 1 tablet (600 mg total) by mouth every 6 (six) hours as needed for mild pain, Disp: 90 tablet, Rfl: 0    PARoxetine (PAXIL) 20 mg tablet, Take 1 tablet (20 mg total) by mouth daily, Disp: 30 tablet, Rfl: 5  Allergies   Allergen Reactions    Ciprofloxacin Palpitations and Rash       Vitals: Blood pressure 110/60, pulse 64, height 5' 10" (1 778 m), weight 98 9 kg (218 lb), SpO2 97 % , Body mass index is 31 28 kg/m²  Oxygen Therapy  SpO2: 97 %    Physical Exam   Physical Exam   Constitutional: He is oriented to person, place, and time  No distress  HENT:   Head: Normocephalic  Mouth/Throat: No oropharyngeal exudate  Eyes: Pupils are equal, round, and reactive to light  No scleral icterus  Neck: Neck supple  No JVD present  Cardiovascular: Normal rate and regular rhythm  Pulmonary/Chest: He has no wheezes  He has no rales  Abdominal: Soft  There is no tenderness  Musculoskeletal: He exhibits no edema  Lymphadenopathy:     He has no cervical adenopathy  Neurological: He is alert and oriented to person, place, and time  Skin: Skin is warm and dry  Psychiatric: He has a normal mood and affect  Labs: I have personally reviewed pertinent lab results  Lab Results   Component Value Date    WBC 9 42 06/13/2018    HGB 14 6 06/13/2018    HCT 46 2 06/13/2018    MCV 96 06/13/2018     06/13/2018     Lab Results   Component Value Date    GLUCOSE 87 12/09/2016    CALCIUM 9 3 06/13/2018     06/13/2018    K 4 7 06/13/2018    CO2 28 06/13/2018     06/13/2018    BUN 15 06/13/2018    CREATININE 1 11 06/13/2018     No results found for: IGE  Lab Results   Component Value Date    ALT 17 06/13/2018    AST 14 06/13/2018    ALKPHOS 80 06/13/2018    BILITOT 0 88 06/13/2018       Imaging and other studies: I have personally reviewed pertinent films in PACS  CT of the chest performed on 6/16/18 shows large right upper lobe cavitary lesion and an additional 8 mm left upper lobe nodule  No mediastinal or hilar adenopathy

## 2018-07-05 ENCOUNTER — ANESTHESIA EVENT (OUTPATIENT)
Dept: GASTROENTEROLOGY | Facility: HOSPITAL | Age: 83
End: 2018-07-05
Payer: COMMERCIAL

## 2018-07-06 ENCOUNTER — ANESTHESIA (OUTPATIENT)
Dept: GASTROENTEROLOGY | Facility: HOSPITAL | Age: 83
End: 2018-07-06
Payer: COMMERCIAL

## 2018-07-06 ENCOUNTER — APPOINTMENT (OUTPATIENT)
Dept: RADIOLOGY | Facility: HOSPITAL | Age: 83
End: 2018-07-06
Payer: COMMERCIAL

## 2018-07-06 ENCOUNTER — HOSPITAL ENCOUNTER (OUTPATIENT)
Facility: HOSPITAL | Age: 83
Setting detail: OUTPATIENT SURGERY
Discharge: HOME | End: 2018-07-06
Attending: INTERNAL MEDICINE | Admitting: INTERNAL MEDICINE
Payer: COMMERCIAL

## 2018-07-06 VITALS
RESPIRATION RATE: 20 BRPM | WEIGHT: 202 LBS | BODY MASS INDEX: 28.28 KG/M2 | HEIGHT: 71 IN | OXYGEN SATURATION: 92 % | SYSTOLIC BLOOD PRESSURE: 132 MMHG | DIASTOLIC BLOOD PRESSURE: 63 MMHG | HEART RATE: 73 BPM | TEMPERATURE: 98.5 F

## 2018-07-06 DIAGNOSIS — J98.4 DISEASE OF LUNG: Primary | ICD-10-CM

## 2018-07-06 PROCEDURE — 88112 CYTOPATH CELL ENHANCE TECH: CPT | Performed by: PATHOLOGY

## 2018-07-06 PROCEDURE — 88305 TISSUE EXAM BY PATHOLOGIST: CPT | Performed by: PATHOLOGY

## 2018-07-06 PROCEDURE — 31624 DX BRONCHOSCOPE/LAVAGE: CPT | Performed by: INTERNAL MEDICINE

## 2018-07-06 PROCEDURE — 88341 IMHCHEM/IMCYTCHM EA ADD ANTB: CPT | Performed by: PATHOLOGY

## 2018-07-06 PROCEDURE — 87206 SMEAR FLUORESCENT/ACID STAI: CPT | Performed by: INTERNAL MEDICINE

## 2018-07-06 PROCEDURE — 31628 BRONCHOSCOPY/LUNG BX EACH: CPT | Performed by: INTERNAL MEDICINE

## 2018-07-06 PROCEDURE — 87070 CULTURE OTHR SPECIMN AEROBIC: CPT | Performed by: INTERNAL MEDICINE

## 2018-07-06 PROCEDURE — 88185 FLOWCYTOMETRY/TC ADD-ON: CPT

## 2018-07-06 PROCEDURE — 88184 FLOWCYTOMETRY/ TC 1 MARKER: CPT

## 2018-07-06 PROCEDURE — 87116 MYCOBACTERIA CULTURE: CPT | Performed by: INTERNAL MEDICINE

## 2018-07-06 PROCEDURE — 87102 FUNGUS ISOLATION CULTURE: CPT | Performed by: INTERNAL MEDICINE

## 2018-07-06 PROCEDURE — 88342 IMHCHEM/IMCYTCHM 1ST ANTB: CPT | Performed by: PATHOLOGY

## 2018-07-06 PROCEDURE — 71045 X-RAY EXAM CHEST 1 VIEW: CPT

## 2018-07-06 RX ORDER — FENTANYL CITRATE/PF 50 MCG/ML
25 SYRINGE (ML) INJECTION
Status: DISCONTINUED | OUTPATIENT
Start: 2018-07-06 | End: 2018-07-06 | Stop reason: HOSPADM

## 2018-07-06 RX ORDER — SODIUM CHLORIDE 9 MG/ML
50 INJECTION, SOLUTION INTRAVENOUS CONTINUOUS
Status: DISCONTINUED | OUTPATIENT
Start: 2018-07-06 | End: 2018-07-06 | Stop reason: HOSPADM

## 2018-07-06 RX ORDER — LIDOCAINE HYDROCHLORIDE 10 MG/ML
INJECTION, SOLUTION EPIDURAL; INFILTRATION; INTRACAUDAL; PERINEURAL AS NEEDED
Status: DISCONTINUED | OUTPATIENT
Start: 2018-07-06 | End: 2018-07-06 | Stop reason: SURG

## 2018-07-06 RX ORDER — PROPOFOL 10 MG/ML
INJECTION, EMULSION INTRAVENOUS CONTINUOUS PRN
Status: DISCONTINUED | OUTPATIENT
Start: 2018-07-06 | End: 2018-07-06 | Stop reason: SURG

## 2018-07-06 RX ORDER — KETAMINE HYDROCHLORIDE 50 MG/ML
INJECTION, SOLUTION, CONCENTRATE INTRAMUSCULAR; INTRAVENOUS AS NEEDED
Status: DISCONTINUED | OUTPATIENT
Start: 2018-07-06 | End: 2018-07-06 | Stop reason: SURG

## 2018-07-06 RX ORDER — PROPOFOL 10 MG/ML
INJECTION, EMULSION INTRAVENOUS AS NEEDED
Status: DISCONTINUED | OUTPATIENT
Start: 2018-07-06 | End: 2018-07-06 | Stop reason: SURG

## 2018-07-06 RX ADMIN — KETAMINE HYDROCHLORIDE 20 MG: 50 INJECTION, SOLUTION INTRAMUSCULAR; INTRAVENOUS at 11:45

## 2018-07-06 RX ADMIN — FENTANYL CITRATE 25 MCG: 50 INJECTION INTRAMUSCULAR; INTRAVENOUS at 12:48

## 2018-07-06 RX ADMIN — FENTANYL CITRATE 25 MCG: 50 INJECTION INTRAMUSCULAR; INTRAVENOUS at 12:54

## 2018-07-06 RX ADMIN — PROPOFOL 40 MG: 10 INJECTION, EMULSION INTRAVENOUS at 11:29

## 2018-07-06 RX ADMIN — SODIUM CHLORIDE 50 ML/HR: 0.9 INJECTION, SOLUTION INTRAVENOUS at 10:53

## 2018-07-06 RX ADMIN — KETAMINE HYDROCHLORIDE 30 MG: 50 INJECTION, SOLUTION INTRAMUSCULAR; INTRAVENOUS at 11:29

## 2018-07-06 RX ADMIN — PROPOFOL 40 MCG/KG/MIN: 10 INJECTION, EMULSION INTRAVENOUS at 11:29

## 2018-07-06 RX ADMIN — LIDOCAINE HYDROCHLORIDE 40 MG: 10 INJECTION, SOLUTION EPIDURAL; INFILTRATION; INTRACAUDAL; PERINEURAL at 11:29

## 2018-07-06 NOTE — PROCEDURES
Bronchoscopy with right upper lobe lung biopsy due to cavitary lesion in the apical segment of the right upper lobe  Consent obtained from the patient, risk and benefits explained in details agreed to the procedure including his wife was at the bedside  The patient had the procedure done in the endoscopy suite, monitored in the endoscopy suite with OR style of monitoring  The patient had a time-out done by the nursing staff which I appreciated, the patient had the procedure done with assistance from respiratory therapy and appreciate their help  The patient had the nostrils and oral cavity nostrils with topical lidocaine, injectable lidocaine throughout the entire bronchoscopy procedure was done with total of 10 mL of 1% lidocaine, the patient was sedated with anesthesia team, appreciate their input  The bronchoscope was passed through the right nostril without difficulty, and the findings as follows:    1  Upper airways appears within normal range  2   The vocal cords are mobile  3   The tracheobronchial tree examined from RB 1 to RB 10 and lb 1 to lb 10 and no endobronchial lesion was noted  4   COPD changes noted in the mucosa  5   The bronchoscope was directed to worse apical segment of the right upper lobe, and it was placed in wedge position  6   Cytology brushing was performed from the apical segment of the right upper lobe  7   Microbiology brushing was obtained from the apical segment of the right upper lobe  8   Biopsies from the apical segment and subsegment of the right upper lobe were done times 12   9   Bronchial washing and BAL was obtained from the right upper lobe with 90 mL injected in and 50 mL was retrieved and sent for microbiology including Gram stain and culture, fungal culture, AFB, cell count and diff, cytology and cell block  10   All the biopsies were sent in cytospin and formalin  11    The bronchoscope after that was removed after injecting 2 injections of epinephrine of 2 mL 1 to 62159 concentration  Hemostasis was noted  12   Stat chest x-ray was done after the procedure, no evidence of pneumothorax, the size of the cavity appears to be smaller likely due to the puncture through it from the for sepsis  Patient tolerated the procedure very well, no immediate complication, the patient was discharged home on Augmentin post bronchoscopy, to be followed by Dr Jameson Tanner at [facility] office  Instructed to return back again to the ER InCase of gross hemoptysis, chest pain, increasing shortness of breath or fever  Thank you for all involved

## 2018-07-06 NOTE — ANESTHESIA POSTPROCEDURE EVALUATION
Post-Op Assessment Note      CV Status:  Stable    Mental Status:  Alert and awake    Hydration Status:  Euvolemic    PONV Controlled:  Controlled    Airway Patency:  Patent    Post Op Vitals Reviewed: Yes          Staff: CRNA           BP   130/77   Temp      Pulse  78   Resp   20   SpO2   97

## 2018-07-06 NOTE — ANESTHESIA PREPROCEDURE EVALUATION
Review of Systems/Medical History  Patient summary reviewed  Chart reviewed  No history of anesthetic complications     Cardiovascular  EKG reviewed, Exercise tolerance (METS): <4, Exercise comment: Deconditioning but no anginal equivalents Hyperlipidemia, Dysrhythmias ,   Comment: Rbbb+bifasic block  remote hx of pe, PE,  Pulmonary  Smoker cigarette smoker  ,        GI/Hepatic  Negative GI/hepatic ROS          Genitourinary malignancy Bladder cancer,        Endo/Other  Negative endo/other ROS      GYN       Hematology   Musculoskeletal    Arthritis     Neurology   Psychology       Comment: Mild dementia          Physical Exam    Airway    Mallampati score: II  TM Distance: >3 FB  Neck ROM: limited     Dental   Comment: edentulous,     Cardiovascular  Rhythm: regular, Rate: normal,     Pulmonary  Breath sounds clear to auscultation, Rhonchi,     Other Findings        Anesthesia Plan  ASA Score- 3     Anesthesia Type- IV sedation with anesthesia with ASA Monitors  Additional Monitors:   Airway Plan:         Plan Factors-    Induction- intravenous  Postoperative Plan-     Informed Consent- Anesthetic plan and risks discussed with patient and spouse  I personally reviewed this patient with the CRNA  Discussed and agreed on the Anesthesia Plan with the CRNA              Lab Results   Component Value Date    WBC 9 42 06/13/2018    HGB 14 6 06/13/2018    HCT 46 2 06/13/2018    MCV 96 06/13/2018     06/13/2018     Lab Results   Component Value Date    GLUCOSE 87 12/09/2016    CALCIUM 9 3 06/13/2018     06/13/2018    K 4 7 06/13/2018    CO2 28 06/13/2018     06/13/2018    BUN 15 06/13/2018    CREATININE 1 11 06/13/2018     No results found for: INR, PROTIME  No results found for: PTT

## 2018-07-08 LAB
BACTERIA BRONCH AEROBE CULT: NO GROWTH
BACTERIA BRONCH AEROBE CULT: NORMAL
GRAM STN SPEC: NORMAL

## 2018-07-12 RX ORDER — AMOXICILLIN AND CLAVULANATE POTASSIUM 875; 125 MG/1; MG/1
1 TABLET, FILM COATED ORAL 2 TIMES DAILY
Refills: 0 | COMMUNITY
Start: 2018-07-06 | End: 2018-07-13 | Stop reason: ALTCHOICE

## 2018-07-13 ENCOUNTER — OFFICE VISIT (OUTPATIENT)
Dept: PULMONOLOGY | Facility: HOSPITAL | Age: 83
End: 2018-07-13
Payer: COMMERCIAL

## 2018-07-13 VITALS
OXYGEN SATURATION: 96 % | WEIGHT: 221 LBS | BODY MASS INDEX: 30.94 KG/M2 | HEART RATE: 69 BPM | HEIGHT: 71 IN | SYSTOLIC BLOOD PRESSURE: 110 MMHG | DIASTOLIC BLOOD PRESSURE: 68 MMHG

## 2018-07-13 DIAGNOSIS — C34.91 SQUAMOUS CELL CARCINOMA OF RIGHT LUNG (HCC): Primary | ICD-10-CM

## 2018-07-13 PROCEDURE — 99214 OFFICE O/P EST MOD 30 MIN: CPT | Performed by: INTERNAL MEDICINE

## 2018-07-13 NOTE — PROGRESS NOTES
Progress Note - Pulmonary   Harry Orantes 80 y o  male MRN: [id number]   Encounter: [id number]      Assessment/Plan:  Patient is an 70-year-old male past medical history significant for dementia who presents for follow-up of recent bronchoscopy  The pathology has returned is consistent with a squamous cell carcinoma  I have discussed results with the patient and his daughter  They are interested in pursuing a PET scan  The patient has very limited functional activity as described by the daughter  Patient reports he is doing well but this is more consistent with his dementia and he actually does very little activity at baseline  I will have his case presented at the next tumor Board and have arranged for the PET scan and follow-up with Oncology  The patient may continue to take ibuprofen or acetaminophen as needed for his chest pain  Follow up with Pulmonary as needed    Plan:  Orders Placed This Encounter   Procedures    NM PET CT skull base to mid thigh     Standing Status:   Future     Standing Expiration Date:   7/13/2022     Scheduling Instructions: Your study will take approximately 2 hours to complete and you will receive complete preparation details in the mail  You will be receiving an injection for this test  Do not eat/drink anything but plain water 6 hours prior to the start of the test       Please bring your insurance cards, a form of photo ID and a list of your medications with you  Arrive 15 minutes prior to your appointment time in order to register  To schedule this appointment, please contact Central Scheduling at [phone]            Ambulatory referral to Hematology / Oncology     Standing Status:   Future     Standing Expiration Date:   1/13/2019     Referral Priority:   Routine     Referral Type:   Consult - AMB     Referral Reason:   Specialty Services Required     Requested Specialty:   Hematology and Oncology     Number of Visits Requested:   1 Expiration Date:   7/13/2019       Subjective:   Denies fevers, chills, nausea or vomiting  Patient's daughter reports patient continues to have a mild cough but this does not in treated on his activities  Patient does minimal activity and does become short of breath with walking from bedroom to his chair  Patient will tell different story but the daughter states that his this is more in line with his dementia  Inhaler Regimen:  none    Remainder of 12 point review of systems negative except as described in HPI  The following portions of the patient's history were reviewed and updated as appropriate: allergies, current medications, past family history, past medical history, past social history, past surgical history and problem list      Objective:   Vitals: Blood pressure 110/68, pulse 69, height 5' 11" (1 803 m), weight 100 kg (221 lb), SpO2 96 % , RA, Body mass index is 30 82 kg/m²  Physical Exam  Gen: Awake, alert, oriented x 1, no acute distress  HEENT: Mucous membranes moist, no oral lesions, no thrush  NECK: No accessory muscle use, JVP not elevated  Cardiac: Regular, single S1, single S2, no murmurs, no rubs, no gallops  Lungs:  Tubular breath sounds on right upper lobe  Abdomen: normoactive bowel sounds, soft nontender, nondistended, no rebound or rigidity, no guarding  Extremities: no cyanosis, no clubbing, no edema  MSK:  Strength equal in all extremities  Derm:  No rashes/lesions noted  Neuro:  Appropriate mood/affect    Labs: I have personally reviewed pertinent lab results  Lab Results   Component Value Date    WBC 9 42 06/13/2018    HGB 14 6 06/13/2018    HCT 46 2 06/13/2018    MCV 96 06/13/2018     06/13/2018     Imaging and other studies: I have personally reviewed pertinent films in PACS  CT Chest 6/16/2018: There is a 60 x 61 mm cavitary lesion in the right upper lobe  This is as depicted on chest x-ray  The wall of the cavity are quite thin    The cavity itself measures 50 x 52 mm  The margins of the cavity are irregular      The apical posterior segment bronchi course into the walls of the cavity      There is also some soft tissue which continues off the caudal aspect of the lesion  This continues down to the level of the hilum on the right  I'm uncertain if this is lung disease, or some adenopathy      There is an 8 mm lesion in the anterior segment of the left upper lobe, on image 16  This shows no cavitation  There is a thin-walled air cyst in the anterior aspect of the left upper lobe  This appears uncomplicated    Pulmonary Function Testing:   No pulmonary function testing available for review  [facility]    Answers for HPI/ROS submitted by the patient on 7/12/2018   Primary symptoms  Do you have chest tightness?: Yes  Do you have a cough?: Yes  Chronicity: chronic  When did you first notice your symptoms?: more than 1 month ago  How often do your symptoms occur?: 2 to 4 times per day  Since you first noticed this problem, how has it changed?: unchanged  Have you had a change in appetite?: Yes  Do you have chest pain?: Yes  Do you have ear congestion?: No  Do you have a fever?: No  Do you have heartburn?: Yes  Do you have fatigue?: Yes  Do you have muscle pain?: Yes  Do you have nasal congestion?: No  Do you have shortness of breath when lying flat?: No  Do you have shortness of breath when you wake up?: No  Do you have post-nasal drip?: No  Do you have a runny nose?: No  Do you have sneezing?: Yes  Do you have a sore throat?: No  Do you have sweats?: No  Do you have trouble swallowing?: No  Which of the following makes your symptoms worse?: nothing  Which of the following makes your symptoms better?: nothing

## 2018-07-17 LAB — SCAN RESULT: NORMAL

## 2018-07-20 ENCOUNTER — HOSPITAL ENCOUNTER (OUTPATIENT)
Dept: RADIOLOGY | Age: 83
Discharge: HOME | End: 2018-07-20
Payer: COMMERCIAL

## 2018-07-20 DIAGNOSIS — C34.91 SQUAMOUS CELL CARCINOMA OF RIGHT LUNG (HCC): ICD-10-CM

## 2018-07-20 LAB — GLUCOSE SERPL-MCNC: 90 MG/DL (ref 65–140)

## 2018-07-20 PROCEDURE — 78815 PET IMAGE W/CT SKULL-THIGH: CPT

## 2018-07-20 PROCEDURE — 82948 REAGENT STRIP/BLOOD GLUCOSE: CPT

## 2018-07-20 PROCEDURE — A9552 F18 FDG: HCPCS

## 2018-07-20 RX ADMIN — IOHEXOL 5 ML: 240 INJECTION, SOLUTION INTRATHECAL; INTRAVASCULAR; INTRAVENOUS; ORAL at 11:18

## 2018-07-23 ENCOUNTER — TELEPHONE (OUTPATIENT)
Dept: PULMONOLOGY | Facility: CLINIC | Age: 83
End: 2018-07-23

## 2018-07-23 DIAGNOSIS — C34.80 MALIGNANT NEOPLASM OF OVERLAPPING SITES OF LUNG, UNSPECIFIED LATERALITY (HCC): Primary | ICD-10-CM

## 2018-07-23 NOTE — TELEPHONE ENCOUNTER
Call patient back to discuss results of PET-CT scan  There are bilateral PET avid lesions that have grown in size since the previous CT scan approximately 1 month ago  This was discussed with the patient's wife as the patient has advanced dementia  The wife reconfirmed that the patient would not want chemotherapy and radiation therapy would be an unlikely option  She has an appointment scheduled with Dr Nakul Davalos tomorrow from Oncology  I have placed a referral to palliative care medicine given the advanced diagnosis and the patient's degree of pain

## 2018-07-24 ENCOUNTER — TELEPHONE (OUTPATIENT)
Dept: HEMATOLOGY ONCOLOGY | Facility: HOSPITAL | Age: 83
End: 2018-07-24

## 2018-07-24 ENCOUNTER — TELEPHONE (OUTPATIENT)
Dept: PALLIATIVE MEDICINE | Facility: HOSPITAL | Age: 83
End: 2018-07-24

## 2018-07-24 ENCOUNTER — OFFICE VISIT (OUTPATIENT)
Dept: HEMATOLOGY ONCOLOGY | Facility: HOSPITAL | Age: 83
End: 2018-07-24
Payer: COMMERCIAL

## 2018-07-24 ENCOUNTER — DOCUMENTATION (OUTPATIENT)
Dept: PULMONOLOGY | Facility: CLINIC | Age: 83
End: 2018-07-24

## 2018-07-24 VITALS
HEART RATE: 64 BPM | HEIGHT: 69 IN | BODY MASS INDEX: 32.73 KG/M2 | WEIGHT: 221 LBS | TEMPERATURE: 97.9 F | SYSTOLIC BLOOD PRESSURE: 142 MMHG | DIASTOLIC BLOOD PRESSURE: 84 MMHG | RESPIRATION RATE: 17 BRPM

## 2018-07-24 DIAGNOSIS — C34.91 SQUAMOUS CELL CARCINOMA OF RIGHT LUNG (HCC): Primary | ICD-10-CM

## 2018-07-24 DIAGNOSIS — C34.11 MALIGNANT NEOPLASM OF UPPER LOBE OF RIGHT LUNG (HCC): ICD-10-CM

## 2018-07-24 PROBLEM — J98.4 CAVITATING MASS IN RIGHT UPPER LUNG LOBE: Status: RESOLVED | Noted: 2018-06-11 | Resolved: 2018-07-24

## 2018-07-24 PROCEDURE — 99205 OFFICE O/P NEW HI 60 MIN: CPT | Performed by: INTERNAL MEDICINE

## 2018-07-24 PROCEDURE — 4040F PNEUMOC VAC/ADMIN/RCVD: CPT | Performed by: INTERNAL MEDICINE

## 2018-07-24 NOTE — LETTER
July 24, 2018     Derek [address] [facility]    Patient: Brigid Brittle   YOB: 1930   Date of Visit: 7/24/2018       Dear Dr Janell Valdovinos:    Thank you for referring Brigid Brittle to me for evaluation  Below are my notes for this consultation  If you have questions, please do not hesitate to call me  I look forward to following your patient along with you  Sincerely,        Hina Irvin DO        CC: DO Hina Shaver DO  7/24/2018  9:36 AM  Sign at close encounter    Brigid Brittle  11/4/1930  [address] [address]    Chief Complaint   Patient presents with   [address] [facility]           Oncology History    June 9, 2018 patient had chest x-ray to investigate shortness of breath  Large right apical lesion with cavitation was noted  June 19, 2018 CT chest showed a 6 cm right upper lobe cavitary lesion  8 mm left upper lobe lesion is noted  No mediastinal adenopathy or effusion  July 6, 2018 bronchoscopy performed  No endobronchial lesion was noted  Multiple biopsies from the right upper lobe were obtained  Biopsy confirmed squamous cell carcinoma, 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, TTF 1-   July 20, 2018 PET-CT showed increase in right upper lobe cavitary lesion, 6 9 cm, SUV 19 4  Several other lesions in the right and left lungs were smaller though increased in size compared to the prior CT scan of a month earlier  Pancreatic cyst, 3 3 cm, no hypermetabolism  Mesenteric lymph nodes up to 1 7 cm without hypermetabolism are noted  Malignant neoplasm of upper lobe of right lung ([address] )    7/24/2018 Initial Diagnosis     Malignant neoplasm of upper lobe of right lung ([address] )          History of Present Illness:  See oncology history above  Review of Systems   Constitutional: Negative for chills and fever     HENT: Negative for nosebleeds  Eyes: Negative for discharge  Respiratory: Negative for cough and shortness of breath  Cardiovascular: Negative for chest pain  Gastrointestinal: Negative for abdominal pain, constipation and diarrhea  Endocrine: Negative for polydipsia  Genitourinary: Negative for hematuria  Musculoskeletal: Negative for arthralgias  Skin: Negative for color change  Allergic/Immunologic: Negative for immunocompromised state  Neurological: Negative for dizziness and headaches  Hematological: Negative for adenopathy  Psychiatric/Behavioral: Negative for agitation  Patient Active Problem List   Diagnosis    Bifascicular block    Dementia    Generalized anxiety disorder    Generalized osteoarthritis    Hyperlipidemia    Malignant tumor of urinary bladder (HCC)    Palpitations    Right bundle branch block (RBBB)    Sleep disturbances    Spinal stenosis    Abnormal CT scan of lung    Malignant neoplasm of upper lobe of right lung [facility])     Past Medical History:   Diagnosis Date    Abnormal abdominal CT scan     right lung    Cancer [facility] )     bladder    Cancer related pain 07/24/2018    upper right chest    Chronic cough     Constipation     on and off for 6 months    Cough 07/24/2018    for about 6-8 weeks    Decreased appetite 07/24/2018    about 6 months    Dementia     Dementia     Fatigue 07/24/2018    3 or 4 months    Pulmonary embolism ([facility] ) 1990s     Past Surgical History:   Procedure Laterality Date    APPENDECTOMY      CATARACT EXTRACTION      COLONOSCOPY      CYSTOSCOPY      With biopsy, Last Assessed:  3/2/16    ND BRONCHOSCOPY,DIAGNOSTIC N/A 7/6/2018    Procedure: BRONCHOSCOPY FLEXIBLE;  Surgeon: Victor M De Souza MD;  Location: [facility];   Service: Pulmonary    TONSILECTOMY AND ADNOIDECTOMY      VARICOSE VEIN SURGERY      Ligation     Family History   Problem Relation Age of Onset    Kidney cancer Father     Coronary artery disease Brother     Diabetes Brother     Hypertension Brother     Heart disease Brother      Social History     Social History    Marital status: /Civil Union     Spouse name: N/A    Number of children: 2    Years of education: N/A     Occupational History          Retired     Social History Main Topics    Smoking status: Former Smoker     Packs/day: 1 00     Years: 30 00     Quit date: 1988    Smokeless tobacco: Never Used    Alcohol use 4 2 oz/week     7 Cans of beer per week      Comment: daily    Drug use: No    Sexual activity: Not on file     Other Topics Concern    Not on file     Social History Narrative    Previous  service       Current Outpatient Prescriptions:     ibuprofen (MOTRIN) 600 mg tablet, Take 1 tablet (600 mg total) by mouth every 6 (six) hours as needed for mild pain, Disp: 90 tablet, Rfl: 0    PARoxetine (PAXIL) 20 mg tablet, Take 1 tablet (20 mg total) by mouth daily, Disp: 90 tablet, Rfl: 1    Psyllium (METAMUCIL) 28 3 % POWD, Take by mouth as needed, Disp: , Rfl:   Allergies   Allergen Reactions    Ciprofloxacin Palpitations and Rash     Vitals:    07/24/18 0811   BP: 142/84   Pulse: 64   Resp: 17   Temp: 97 9 °F (36 6 °C)       Physical Exam   Constitutional: He is oriented to person, place, and time  He appears well-developed  HENT:   Head: Normocephalic  Eyes: Pupils are equal, round, and reactive to light  Neck: Neck supple  Cardiovascular: Normal rate and regular rhythm  No murmur heard  Pulmonary/Chest: Breath sounds normal  He has no wheezes  He has no rales  Abdominal: Soft  There is no tenderness  Musculoskeletal: Normal range of motion  He exhibits no edema or tenderness  Lymphadenopathy:     He has no cervical adenopathy  Neurological: He is alert and oriented to person, place, and time  He has normal reflexes  No cranial nerve deficit  Skin: No rash noted  No erythema  Psychiatric: He has a normal mood and affect   His behavior is normal  Labs:  CBC, Coags, BMP, Mg, Phos      Imaging  Xr Chest Portable    Result Date: 7/6/2018  Narrative: CHEST INDICATION:   Follow-up right apical cavitary lesion    COMPARISON:  6/9/2018 EXAM PERFORMED/VIEWS:  XR CHEST PORTABLE FINDINGS: Cardiomediastinal silhouette appears unremarkable  No effusions  No congestive failure  No pneumothorax  The previously identified cavitary lesion within the right apex appears slightly smaller than the prior examination  Scarring within the left lung base grossly unchanged  The remaining lung fields are clear  Osseous structures appear within normal limits for patient age  Impression: Slight interval decrease in size in the cavitary lesion identified at the right apex  Persistent mild scarring within the left lung base  Workstation performed: [id number]     Nm Pet Ct Skull Base To Mid Thigh    Result Date: 7/20/2018  Narrative: PET/CT SCAN INDICATION: Newly diagnosed lung cancer  Initial staging  C34 91: Malignant neoplasm of unspecified part of right bronchus or lung MODIFIER: PI COMPARISON: CT chest from 6/16/2018 CELL TYPE:  squamous cell carcinoma ([facility] lung biopsy 7/6/18) TECHNIQUE:   8 0 mCi F-18-FDG administered IV  Multiplanar attenuation corrected and non attenuation corrected PET images are available for interpretation, and contiguous, low dose, axial CT sections were obtained from the skull vertex through the femurs    Oral contrast material (7 5 cc Omnipaque-240 in 300 cc water) was administered  Intravenous contrast material was not utilized  This examination, like all CT scans performed in the [facility], was performed utilizing techniques to minimize radiation dose exposure, including the use of iterative reconstruction and automated exposure control  Fasting serum glucose: 90 mg/dl FINDINGS: VISUALIZED BRAIN:   No acute abnormalities are seen  HEAD/NECK:   There is a physiologic distribution of FDG   No FDG avid cervical adenopathy is seen  CT images: Unremarkable  CHEST:   FDG avid cavitary lesions noted bilaterally  Dominant cavitary lesion in the right upper lobe posteriorly demonstrates SUV max of 19 4  This now measures 6 9 x 6 2 cm, image 115 series 3, slightly larger, previously 6 1 x 6 0 cm  There is an adjacent cavitary lesion more centrally extending to the hilum, SUV max of 14 3  This measures 3 7 x 2 8 cm in size  This is also larger, previously this measured 2 9 x 2 3 cm in size  There is increased central cavitation  More inferiorly there is an additional smaller cavitary lesion, SUV max of 14 0  This measures 2 0 x 1 9 cm  There is new central cavitation  Previously this nodule measured 1 4 x 1 3 cm  Cavitary lesion noted in the left upper lobe anteriorly with SUV max of 6 1  This measures 1 5 x 1 1 cm, image 118 series 3  This is larger previously 1 2 x 0 8 cm  No mediastinal or left perihilar foci of FDG uptake suspicious for malignancy  CT images: Scattered lung cysts noted  Scattered coronary calcifications noted  ABDOMEN:   No FDG avid soft tissue lesions are seen  CT images: There is a cyst measuring 3 3 x 1 8 cm medial to the uncinate process of the pancreas, image 204 series 3  This is not FDG avid  Haziness is noted of the central mesenteric fat on the left with scattered prominent mesenteric lymph nodes  A lymph node here measures 1 7 x 0 8 cm, image 204 series 3  These lymph nodes are not FDG avid  Abdominal aorta is ectatic  There are scattered small renal cysts  PELVIS: No FDG avid soft tissue lesions are seen  CT images: Unremarkable  OSSEOUS STRUCTURES: No FDG avid lesions are seen  CT images: Mild superior endplate deformity at L3  Multilevel degenerative spurring of the spine  Impression: 1  FDG avid cavitary lesions noted bilaterally compatible with hypermetabolic malignancy  Three adjacent FDG avid cavitary lesions noted on the right and one on the left   These demonstrate interval enlargement from the prior CT  2   There is a central cavitary lesion in the right upper lobe extending to the hilum, likely a pulmonary lesion rather than brianna metastasis  Otherwise there are no findings suspicious for brianna metastasis to the mediastinum or left perihilar region  3  No findings suspicious for hypermetabolic metastasis to the neck, abdomen or pelvis  4   3 3 cm cyst noted medial to the uncinate process of the pancreas probably a pancreatic cyst   This is not FDG avid  5   Haziness is noted of the central mesenteric fat on the left with scattered prominent mesenteric lymph nodes, not FDG avid  Sclerosing or retractile mesenteritis are considerations  Workstation performed: [id number]     I reviewed the above laboratory and imaging data  Discussion/Summary:  In summary, this is an 45-year-old male history of 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  We reviewed that medical therapy could be considered  Radiation and surgery are not applicable  We reviewed issues related to PDL 1 testing and indications for immunotherapy in the 1st and 2nd line settings  We reviewed that the goal of therapy is palliative with potential for survival benefit  Likelihood of response to immunotherapy is dependent upon PDL results ranging from 15% to greater than 50%  We reviewed durations of response, potential toxicities, frequencies, etc   While the patient carries a diagnosis of dementia he is able to articulate that he is not particularly interested in pursuing any treatment for his cancer or other medical problems  He states that he is satisfied with his life and is interested in pursuing things that are important to him but not looking for a miracle or expecting any significant prolongation of life in the face of significant medical illness    Again, the patient was able to articulate this repeatedly and ended number of different ways during the visit today  Based on this discussion I supported the previous recommendation for palliative care and will be investigating the most practical way to move forward in this direction  I reviewed the above considerations at length with the patient and his wife  They voiced understanding and agreement

## 2018-07-24 NOTE — PROGRESS NOTES
Dana Ramon  11/4/1930  [address] [facility] [place]  [address]  [facility] [address]    Chief Complaint   Patient presents with   [address]            Oncology History    June 9, 2018 patient had chest x-ray to investigate shortness of breath  Large right apical lesion with cavitation was noted  June 19, 2018 CT chest showed a 6 cm right upper lobe cavitary lesion  8 mm left upper lobe lesion is noted  No mediastinal adenopathy or effusion  July 6, 2018 bronchoscopy performed  No endobronchial lesion was noted  Multiple biopsies from the right upper lobe were obtained  Biopsy confirmed squamous cell carcinoma, 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, TTF 1-   July 20, 2018 PET-CT showed increase in right upper lobe cavitary lesion, 6 9 cm, SUV 19 4  Several other lesions in the right and left lungs were smaller though increased in size compared to the prior CT scan of a month earlier  Pancreatic cyst, 3 3 cm, no hypermetabolism  Mesenteric lymph nodes up to 1 7 cm without hypermetabolism are noted  Malignant neoplasm of upper lobe of right lung ([address] )    7/24/2018 Initial Diagnosis     Malignant neoplasm of upper lobe of right lung ([address] )          History of Present Illness:  See oncology history above  Review of Systems   Constitutional: Negative for chills and fever  HENT: Negative for nosebleeds  Eyes: Negative for discharge  Respiratory: Negative for cough and shortness of breath  Cardiovascular: Negative for chest pain  Gastrointestinal: Negative for abdominal pain, constipation and diarrhea  Endocrine: Negative for polydipsia  Genitourinary: Negative for hematuria  Musculoskeletal: Negative for arthralgias  Skin: Negative for color change  Allergic/Immunologic: Negative for immunocompromised state  Neurological: Negative for dizziness and headaches  Hematological: Negative for adenopathy  Psychiatric/Behavioral: Negative for agitation  Patient Active Problem List   Diagnosis    Bifascicular block    Dementia    Generalized anxiety disorder    Generalized osteoarthritis    Hyperlipidemia    Malignant tumor of urinary bladder (HCC)    Palpitations    Right bundle branch block (RBBB)    Sleep disturbances    Spinal stenosis    Abnormal CT scan of lung    Malignant neoplasm of upper lobe of right lung [facility])     Past Medical History:   Diagnosis Date    Abnormal abdominal CT scan     right lung    [facility] [address]     bladder    Cancer related pain 07/24/2018    upper right chest    Chronic cough     Constipation     on and off for 6 months    Cough 07/24/2018    for about 6-8 weeks    Decreased appetite 07/24/2018    about 6 months    Dementia     Dementia     Fatigue 07/24/2018    3 or 4 months    Pulmonary embolism ([facility] [address] ) 1990s     Past Surgical History:   Procedure Laterality Date    APPENDECTOMY      CATARACT EXTRACTION      COLONOSCOPY      CYSTOSCOPY      With biopsy, Last Assessed:  3/2/16    UT BRONCHOSCOPY,DIAGNOSTIC N/A 7/6/2018    Procedure: BRONCHOSCOPY FLEXIBLE;  Surgeon: Marie Newton MD;  Location: [facility];   Service: Pulmonary    TONSILECTOMY AND ADNOIDECTOMY      VARICOSE VEIN SURGERY      Ligation     Family History   Problem Relation Age of Onset    Kidney cancer Father     Coronary artery disease Brother     Diabetes Brother     Hypertension Brother     Heart disease Brother      Social History     Social History    Marital status: /Civil Union     Spouse name: N/A    Number of children: 2    Years of education: N/A     Occupational History          Retired     Social History Main Topics    Smoking status: Former Smoker     Packs/day: 1 00     Years: 30 00     Quit date: 1988    Smokeless tobacco: Never Used    Alcohol use 4 2 oz/week     7 Cans of beer per week      Comment: daily    Drug use: No    Sexual activity: Not on file     Other Topics Concern    Not on file     Social History Narrative    Previous  service       Current Outpatient Prescriptions:     ibuprofen (MOTRIN) 600 mg tablet, Take 1 tablet (600 mg total) by mouth every 6 (six) hours as needed for mild pain, Disp: 90 tablet, Rfl: 0    PARoxetine (PAXIL) 20 mg tablet, Take 1 tablet (20 mg total) by mouth daily, Disp: 90 tablet, Rfl: 1    Psyllium (METAMUCIL) 28 3 % POWD, Take by mouth as needed, Disp: , Rfl:   Allergies   Allergen Reactions    Ciprofloxacin Palpitations and Rash     Vitals:    07/24/18 0811   BP: 142/84   Pulse: 64   Resp: 17   Temp: 97 9 °F (36 6 °C)       Physical Exam   Constitutional: He is oriented to person, place, and time  He appears well-developed  HENT:   Head: Normocephalic  Eyes: Pupils are equal, round, and reactive to light  Neck: Neck supple  Cardiovascular: Normal rate and regular rhythm  No murmur heard  Pulmonary/Chest: Breath sounds normal  He has no wheezes  He has no rales  Abdominal: Soft  There is no tenderness  Musculoskeletal: Normal range of motion  He exhibits no edema or tenderness  Lymphadenopathy:     He has no cervical adenopathy  Neurological: He is alert and oriented to person, place, and time  He has normal reflexes  No cranial nerve deficit  Skin: No rash noted  No erythema  Psychiatric: He has a normal mood and affect  His behavior is normal          Labs:  CBC, Coags, BMP, Mg, Phos      Imaging  Xr Chest Portable    Result Date: 7/6/2018  Narrative: CHEST INDICATION:   Follow-up right apical cavitary lesion    COMPARISON:  6/9/2018 EXAM PERFORMED/VIEWS:  XR CHEST PORTABLE FINDINGS: Cardiomediastinal silhouette appears unremarkable  No effusions  No congestive failure  No pneumothorax  The previously identified cavitary lesion within the right apex appears slightly smaller than the prior examination  Scarring within the left lung base grossly unchanged    The remaining lung fields are clear  Osseous structures appear within normal limits for patient age  Impression: Slight interval decrease in size in the cavitary lesion identified at the right apex  Persistent mild scarring within the left lung base  Workstation performed: [id number]     Nm Pet Ct Skull Base To Mid Thigh    Result Date: 7/20/2018  Narrative: PET/CT SCAN INDICATION: Newly diagnosed lung cancer  Initial staging  C34 91: Malignant neoplasm of unspecified part of right bronchus or lung MODIFIER: PI COMPARISON: CT chest from 6/16/2018 CELL TYPE:  squamous cell carcinoma ([facility] lung biopsy 7/6/18) TECHNIQUE:   8 0 mCi F-18-FDG administered IV  Multiplanar attenuation corrected and non attenuation corrected PET images are available for interpretation, and contiguous, low dose, axial CT sections were obtained from the skull vertex through the femurs    Oral contrast material (7 5 cc Omnipaque-240 in 300 cc water) was administered  Intravenous contrast material was not utilized  This examination, like all CT scans performed in the [facility], was performed utilizing techniques to minimize radiation dose exposure, including the use of iterative reconstruction and automated exposure control  Fasting serum glucose: 90 mg/dl FINDINGS: VISUALIZED BRAIN:   No acute abnormalities are seen  HEAD/NECK:   There is a physiologic distribution of FDG  No FDG avid cervical adenopathy is seen  CT images: Unremarkable  CHEST:   FDG avid cavitary lesions noted bilaterally  Dominant cavitary lesion in the right upper lobe posteriorly demonstrates SUV max of 19 4  This now measures 6 9 x 6 2 cm, image 115 series 3, slightly larger, previously 6 1 x 6 0 cm  There is an adjacent cavitary lesion more centrally extending to the hilum, SUV max of 14 3  This measures 3 7 x 2 8 cm in size  This is also larger, previously this measured 2 9 x 2 3 cm in size  There is increased central cavitation   More inferiorly there is an additional smaller cavitary lesion, SUV max of 14 0  This measures 2 0 x 1 9 cm  There is new central cavitation  Previously this nodule measured 1 4 x 1 3 cm  Cavitary lesion noted in the left upper lobe anteriorly with SUV max of 6 1  This measures 1 5 x 1 1 cm, image 118 series 3  This is larger previously 1 2 x 0 8 cm  No mediastinal or left perihilar foci of FDG uptake suspicious for malignancy  CT images: Scattered lung cysts noted  Scattered coronary calcifications noted  ABDOMEN:   No FDG avid soft tissue lesions are seen  CT images: There is a cyst measuring 3 3 x 1 8 cm medial to the uncinate process of the pancreas, image 204 series 3  This is not FDG avid  Haziness is noted of the central mesenteric fat on the left with scattered prominent mesenteric lymph nodes  A lymph node here measures 1 7 x 0 8 cm, image 204 series 3  These lymph nodes are not FDG avid  Abdominal aorta is ectatic  There are scattered small renal cysts  PELVIS: No FDG avid soft tissue lesions are seen  CT images: Unremarkable  OSSEOUS STRUCTURES: No FDG avid lesions are seen  CT images: Mild superior endplate deformity at L3  Multilevel degenerative spurring of the spine  Impression: 1  FDG avid cavitary lesions noted bilaterally compatible with hypermetabolic malignancy  Three adjacent FDG avid cavitary lesions noted on the right and one on the left  These demonstrate interval enlargement from the prior CT  2   There is a central cavitary lesion in the right upper lobe extending to the hilum, likely a pulmonary lesion rather than brianna metastasis  Otherwise there are no findings suspicious for brianna metastasis to the mediastinum or left perihilar region  3  No findings suspicious for hypermetabolic metastasis to the neck, abdomen or pelvis  4   3 3 cm cyst noted medial to the uncinate process of the pancreas probably a pancreatic cyst   This is not FDG avid   5   Haziness is noted of the central mesenteric fat on the left with scattered prominent mesenteric lymph nodes, not FDG avid  Sclerosing or retractile mesenteritis are considerations  Workstation performed: [id number]     I reviewed the above laboratory and imaging data  Discussion/Summary:  In summary, this is an 80-year-old male history of 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  We reviewed that medical therapy could be considered  Radiation and surgery are not applicable  We reviewed issues related to PDL 1 testing and indications for immunotherapy in the 1st and 2nd line settings  We reviewed that the goal of therapy is palliative with potential for survival benefit  Likelihood of response to immunotherapy is dependent upon PDL results ranging from 15% to greater than 50%  We reviewed durations of response, potential toxicities, frequencies, etc   While the patient carries a diagnosis of dementia he is able to articulate that he is not particularly interested in pursuing any treatment for his cancer or other medical problems  He states that he is satisfied with his life and is interested in pursuing things that are important to him but not looking for a miracle or expecting any significant prolongation of life in the face of significant medical illness  Again, the patient was able to articulate this repeatedly and ended number of different ways during the visit today  Based on this discussion I supported the previous recommendation for palliative care and will be investigating the most practical way to move forward in this direction  I reviewed the above considerations at length with the patient and his wife  They voiced understanding and agreement

## 2018-07-24 NOTE — PROGRESS NOTES
I called Palliative care to schedule an appointment for the patient spoke to [facility] she will call the patient with an appointment

## 2018-07-27 ENCOUNTER — TELEPHONE (OUTPATIENT)
Dept: FAMILY MEDICINE CLINIC | Facility: CLINIC | Age: 83
End: 2018-07-27

## 2018-07-27 DIAGNOSIS — C34.11 MALIGNANT NEOPLASM OF UPPER LOBE OF RIGHT LUNG (HCC): Primary | ICD-10-CM

## 2018-07-27 RX ORDER — MORPHINE SULFATE 100 MG/5ML
SOLUTION ORAL
Qty: 30 ML | Refills: 0 | Status: SHIPPED | OUTPATIENT
Start: 2018-07-27 | End: 2018-08-05 | Stop reason: SDUPTHER

## 2018-07-27 NOTE — TELEPHONE ENCOUNTER
Pts wife called and states he saw oncology and refused any treatment for his cancer and will be going into palliative care but not until 08/10/18- he is in a lot of pain in his back and shoulder and is asking if you can prescribe something for him till then

## 2018-07-30 ENCOUNTER — APPOINTMENT (EMERGENCY)
Dept: RADIOLOGY | Facility: HOSPITAL | Age: 83
DRG: 194 | End: 2018-07-30
Payer: COMMERCIAL

## 2018-07-30 ENCOUNTER — APPOINTMENT (EMERGENCY)
Dept: CT IMAGING | Facility: HOSPITAL | Age: 83
DRG: 194 | End: 2018-07-30
Payer: COMMERCIAL

## 2018-07-30 ENCOUNTER — HOSPITAL ENCOUNTER (INPATIENT)
Facility: HOSPITAL | Age: 83
LOS: 3 days | Discharge: HOSPICE | DRG: 194 | End: 2018-08-02
Attending: EMERGENCY MEDICINE | Admitting: HOSPITALIST
Payer: COMMERCIAL

## 2018-07-30 DIAGNOSIS — C34.90 PRIMARY MALIGNANT NEOPLASM OF LUNG METASTATIC TO OTHER SITE, UNSPECIFIED LATERALITY (HCC): ICD-10-CM

## 2018-07-30 DIAGNOSIS — R41.82 ALTERED MENTAL STATUS, UNSPECIFIED ALTERED MENTAL STATUS TYPE: ICD-10-CM

## 2018-07-30 DIAGNOSIS — J18.9 PNEUMONIA OF RIGHT UPPER LOBE DUE TO INFECTIOUS ORGANISM: Primary | ICD-10-CM

## 2018-07-30 DIAGNOSIS — J18.9 CAP (COMMUNITY ACQUIRED PNEUMONIA): ICD-10-CM

## 2018-07-30 LAB
ANION GAP SERPL CALCULATED.3IONS-SCNC: 9 MMOL/L (ref 4–13)
APTT PPP: 31 SECONDS (ref 24–36)
BASOPHILS # BLD AUTO: 0 THOUSANDS/ΜL (ref 0–0.1)
BASOPHILS NFR BLD AUTO: 0 % (ref 0–2)
BUN SERPL-MCNC: 27 MG/DL (ref 7–25)
CALCIUM SERPL-MCNC: 9.5 MG/DL (ref 8.6–10.5)
CHLORIDE SERPL-SCNC: 104 MMOL/L (ref 98–107)
CO2 SERPL-SCNC: 27 MMOL/L (ref 21–31)
CREAT SERPL-MCNC: 1.22 MG/DL (ref 0.7–1.3)
EOSINOPHIL # BLD AUTO: 0.1 THOUSAND/ΜL (ref 0–0.61)
EOSINOPHIL NFR BLD AUTO: 1 % (ref 0–5)
ERYTHROCYTE [DISTWIDTH] IN BLOOD BY AUTOMATED COUNT: 13.3 % (ref 11.5–14.5)
GFR SERPL CREATININE-BSD FRML MDRD: 53 ML/MIN/1.73SQ M
GLUCOSE SERPL-MCNC: 111 MG/DL (ref 65–99)
HCT VFR BLD AUTO: 39.8 % (ref 36.5–49.3)
HGB BLD-MCNC: 13.7 G/DL (ref 14–18)
INR PPP: 1.05 (ref 0.9–1.5)
LACTATE SERPL-SCNC: 1.3 MMOL/L (ref 0.5–2)
LYMPHOCYTES # BLD AUTO: 1.1 THOUSANDS/ΜL (ref 0.6–4.47)
LYMPHOCYTES NFR BLD AUTO: 12 % (ref 21–51)
MCH RBC QN AUTO: 31.3 PG (ref 26–34)
MCHC RBC AUTO-ENTMCNC: 34.5 G/DL (ref 31–37)
MCV RBC AUTO: 91 FL (ref 81–99)
MONOCYTES # BLD AUTO: 1 THOUSAND/ΜL (ref 0.17–1.22)
MONOCYTES NFR BLD AUTO: 10 % (ref 2–12)
NEUTROPHILS # BLD AUTO: 7.5 THOUSANDS/ΜL (ref 1.4–6.5)
NEUTS SEG NFR BLD AUTO: 77 % (ref 42–75)
NRBC BLD AUTO-RTO: 0 /100 WBCS
PLATELET # BLD AUTO: 296 THOUSANDS/UL (ref 149–390)
PMV BLD AUTO: 8.4 FL (ref 8.6–11.7)
POTASSIUM SERPL-SCNC: 4 MMOL/L (ref 3.5–5.5)
PROTHROMBIN TIME: 12.2 SECONDS (ref 10.1–12.9)
RBC # BLD AUTO: 4.38 MILLION/UL (ref 4.3–5.9)
SODIUM SERPL-SCNC: 140 MMOL/L (ref 134–143)
TROPONIN I SERPL-MCNC: <0.03 NG/ML
WBC # BLD AUTO: 9.7 THOUSAND/UL (ref 4.8–10.8)

## 2018-07-30 PROCEDURE — 85730 THROMBOPLASTIN TIME PARTIAL: CPT | Performed by: EMERGENCY MEDICINE

## 2018-07-30 PROCEDURE — 99285 EMERGENCY DEPT VISIT HI MDM: CPT

## 2018-07-30 PROCEDURE — 71045 X-RAY EXAM CHEST 1 VIEW: CPT

## 2018-07-30 PROCEDURE — 36415 COLL VENOUS BLD VENIPUNCTURE: CPT | Performed by: EMERGENCY MEDICINE

## 2018-07-30 PROCEDURE — 85610 PROTHROMBIN TIME: CPT | Performed by: EMERGENCY MEDICINE

## 2018-07-30 PROCEDURE — 84484 ASSAY OF TROPONIN QUANT: CPT | Performed by: EMERGENCY MEDICINE

## 2018-07-30 PROCEDURE — 93005 ELECTROCARDIOGRAM TRACING: CPT

## 2018-07-30 PROCEDURE — 99222 1ST HOSP IP/OBS MODERATE 55: CPT | Performed by: PHYSICIAN ASSISTANT

## 2018-07-30 PROCEDURE — 83605 ASSAY OF LACTIC ACID: CPT | Performed by: EMERGENCY MEDICINE

## 2018-07-30 PROCEDURE — 70450 CT HEAD/BRAIN W/O DYE: CPT

## 2018-07-30 PROCEDURE — 80048 BASIC METABOLIC PNL TOTAL CA: CPT | Performed by: EMERGENCY MEDICINE

## 2018-07-30 PROCEDURE — 85025 COMPLETE CBC W/AUTO DIFF WBC: CPT | Performed by: EMERGENCY MEDICINE

## 2018-07-30 PROCEDURE — 87147 CULTURE TYPE IMMUNOLOGIC: CPT | Performed by: EMERGENCY MEDICINE

## 2018-07-30 PROCEDURE — 87040 BLOOD CULTURE FOR BACTERIA: CPT | Performed by: EMERGENCY MEDICINE

## 2018-07-30 RX ORDER — PAROXETINE HYDROCHLORIDE 20 MG/1
20 TABLET, FILM COATED ORAL
Status: DISCONTINUED | OUTPATIENT
Start: 2018-07-30 | End: 2018-08-02 | Stop reason: HOSPADM

## 2018-07-30 RX ORDER — ACETAMINOPHEN 325 MG/1
650 TABLET ORAL EVERY 6 HOURS PRN
Status: DISCONTINUED | OUTPATIENT
Start: 2018-07-30 | End: 2018-08-02 | Stop reason: HOSPADM

## 2018-07-30 RX ORDER — HEPARIN SODIUM 5000 [USP'U]/ML
5000 INJECTION, SOLUTION INTRAVENOUS; SUBCUTANEOUS EVERY 8 HOURS SCHEDULED
Status: DISCONTINUED | OUTPATIENT
Start: 2018-07-30 | End: 2018-08-02 | Stop reason: HOSPADM

## 2018-07-30 RX ORDER — MORPHINE SULFATE 100 MG/5ML
5 SOLUTION ORAL EVERY 4 HOURS PRN
Status: DISCONTINUED | OUTPATIENT
Start: 2018-07-30 | End: 2018-08-02 | Stop reason: HOSPADM

## 2018-07-30 RX ORDER — HEPARIN SODIUM 5000 [USP'U]/ML
5000 INJECTION, SOLUTION INTRAVENOUS; SUBCUTANEOUS EVERY 8 HOURS SCHEDULED
Status: DISCONTINUED | OUTPATIENT
Start: 2018-07-30 | End: 2018-07-30

## 2018-07-30 RX ORDER — IBUPROFEN 600 MG/1
600 TABLET ORAL EVERY 6 HOURS PRN
Status: DISCONTINUED | OUTPATIENT
Start: 2018-07-30 | End: 2018-07-31

## 2018-07-30 RX ADMIN — SODIUM CHLORIDE 1000 ML: 0.9 INJECTION, SOLUTION INTRAVENOUS at 18:25

## 2018-07-30 RX ADMIN — CEFEPIME HYDROCHLORIDE 2000 MG: 2 INJECTION, SOLUTION INTRAVENOUS at 18:26

## 2018-07-30 NOTE — PLAN OF CARE
CARDIOVASCULAR - ADULT     Maintains optimal cardiac output and hemodynamic stability Progressing     Absence of cardiac dysrhythmias or at baseline rhythm Progressing        DISCHARGE PLANNING     Discharge to home or other facility with appropriate resources Progressing        GASTROINTESTINAL - ADULT     Minimal or absence of nausea and/or vomiting Progressing     Maintains or returns to baseline bowel function Progressing     Maintains adequate nutritional intake Progressing        GENITOURINARY - ADULT     Maintains or returns to baseline urinary function Progressing     Absence of urinary retention Progressing        HEMATOLOGIC - ADULT     Maintains hematologic stability Progressing        INFECTION - ADULT     Absence or prevention of progression during hospitalization Progressing        Knowledge Deficit     Patient/family/caregiver demonstrates understanding of disease process, treatment plan, medications, and discharge instructions Progressing        METABOLIC, FLUID AND ELECTROLYTES - ADULT     Electrolytes maintained within normal limits Progressing     Fluid balance maintained Progressing     Glucose maintained within target range Progressing        MUSCULOSKELETAL - ADULT     Maintain or return mobility to safest level of function Progressing     Maintain proper alignment of affected body part Progressing        PAIN - ADULT     Verbalizes/displays adequate comfort level or baseline comfort level Progressing        RESPIRATORY - ADULT     Achieves optimal ventilation and oxygenation Progressing        SAFETY ADULT     Patient will remain free of falls Progressing     Maintain or return to baseline ADL function Progressing     Maintain or return mobility status to optimal level Progressing        SKIN/TISSUE INTEGRITY - ADULT     Skin integrity remains intact Progressing     Incision(s), wounds(s) or drain site(s) healing without S/S of infection Progressing     Oral mucous membranes remain intact Progressing

## 2018-07-30 NOTE — ED PROVIDER NOTES
History  Chief Complaint   Patient presents with    Altered Mental Status     pt  with stage 4 lung ca was diaphoretic 4 hours PTA, c/o "pain all over"  EMS arrived to find pt  confused x4    Pain     pt  reported chest pain, abdominal pain earlier today  denies any current pain  70-year-old male was altered mental status since 2:00 p m  He patient initially did not know his wife now confused to the date  Patient was diagnosed with squamous cell metastatic lung CA earlier this month  No chest pain or shortness of breath  No cough, no fever, no vomiting, no dysuria  Unable to obtain further history secondary to altered mental status        History provided by:  Spouse and patient  History limited by:  Mental status change  Altered Mental Status   Presenting symptoms: confusion and disorientation    Severity:  Moderate  Most recent episode: Today  Duration:  2 hours  Progression:  Improving  Chronicity:  New  Associated symptoms: no abdominal pain, no difficulty breathing, no fever, no headaches, no nausea, no slurred speech, no vomiting and no weakness        Prior to Admission Medications   Prescriptions Last Dose Informant Patient Reported? Taking? PARoxetine (PAXIL) 20 mg tablet 2018 at 2200  No Yes   Sig: Take 1 tablet (20 mg total) by mouth daily   Psyllium (METAMUCIL) 28 3 % POWD Unknown at Unknown time  Yes No   Sig: Take by mouth as needed   ibuprofen (MOTRIN) 600 mg tablet 2018 at 0700  No Yes   Sig: Take 1 tablet (600 mg total) by mouth every 6 (six) hours as needed for mild pain   morphine 20 mg/mL concentrated solution 2018 at 1400  No Yes   Si 25 ml sl q 4 hours prn        Facility-Administered Medications: None       Past Medical History:   Diagnosis Date    Abnormal abdominal CT scan     right lung    Cancer [facility] [address] [facility]     bladder    Cancer related pain 2018    upper right chest    Chronic cough     Constipation     on and off for 6 months    Cough 07/24/2018    for about 6-8 weeks    Decreased appetite 07/24/2018    about 6 months    Dementia     Dementia     Fatigue 07/24/2018    3 or 4 months    Pulmonary embolism ([facility] [address] [facility] 1990s       Past Surgical History:   Procedure Laterality Date    APPENDECTOMY      CATARACT EXTRACTION      COLONOSCOPY      CYSTOSCOPY      With biopsy, Last Assessed:  3/2/16    HI BRONCHOSCOPY,DIAGNOSTIC N/A 7/6/2018    Procedure: BRONCHOSCOPY FLEXIBLE;  Surgeon: Ilia Fall MD;  Location: [facility]; Service: Pulmonary    TONSILECTOMY AND ADNOIDECTOMY      VARICOSE VEIN SURGERY      Ligation       Family History   Problem Relation Age of Onset    Kidney cancer Father     Coronary artery disease Brother     Diabetes Brother     Hypertension Brother     Heart disease Brother      I have reviewed and agree with the history as documented  Social History   Substance Use Topics    Smoking status: Former Smoker     Packs/day: 1 00     Years: 30 00     Quit date: 1988    Smokeless tobacco: Never Used    Alcohol use 8 4 oz/week     14 Cans of beer per week      Comment: 2 beers daily        Review of Systems   Unable to perform ROS: Mental status change   Constitutional: Negative for diaphoresis and fever  HENT: Negative for rhinorrhea  Respiratory: Negative for cough and shortness of breath  Cardiovascular: Negative for chest pain  Gastrointestinal: Negative for abdominal pain, nausea and vomiting  Genitourinary: Negative for dysuria  Neurological: Negative for weakness, numbness and headaches  Psychiatric/Behavioral: Positive for confusion  Physical Exam  Physical Exam   Constitutional: He appears well-developed and well-nourished  HENT:   Head: Normocephalic and atraumatic  Nose: Nose normal    Mouth/Throat: Oropharynx is clear and moist  No oropharyngeal exudate  Eyes: Conjunctivae and EOM are normal  Pupils are equal, round, and reactive to light  No scleral icterus     Neck: Normal range of motion  Neck supple  No JVD present  No tracheal deviation present  Cardiovascular: Normal rate, regular rhythm and normal heart sounds  No murmur heard  Pulmonary/Chest: Effort normal  No respiratory distress  He has no wheezes  He has rales (Positive rales left base)  Abdominal: Soft  Bowel sounds are normal  There is no tenderness  There is no guarding  Musculoskeletal: Normal range of motion  He exhibits no edema or tenderness  Left leg chronically larger than the right as per his wife   Neurological: He is alert  No cranial nerve deficit or sensory deficit  He exhibits normal muscle tone  Oriented x2  Patient is confused to the date  5/5 motor, nl sens   Skin: Skin is warm and dry  Psychiatric: He has a normal mood and affect  His behavior is normal    Nursing note and vitals reviewed  Vital Signs  ED Triage Vitals [07/30/18 1547]   Temperature Pulse Respirations Blood Pressure SpO2   97 9 °F (36 6 °C) 78 18 127/62 95 %      Temp Source Heart Rate Source Patient Position - Orthostatic VS BP Location FiO2 (%)   Temporal -- Lying Right arm --      Pain Score       No Pain           Vitals:    07/30/18 1745 07/30/18 1800 07/30/18 1827 07/30/18 1841   BP: 124/64 127/64 136/67 136/64   Pulse: 79 79 79 84   Patient Position - Orthostatic VS:   Lying Lying       Visual Acuity      ED Medications  Medications   sodium chloride 0 9 % bolus 1,000 mL (1,000 mL Intravenous New Bag 7/30/18 1825)   cefepime (MAXIPIME) IVPB (premix) 2,000 mg (2,000 mg Intravenous New Bag 7/30/18 1826)       Diagnostic Studies  Results Reviewed     Procedure Component Value Units Date/Time    Blood culture #1 [[id number]] Collected:  07/30/18 1750    Lab Status: In process Specimen:  Blood from Hand, Right Updated:  07/30/18 1848    Blood culture #2 [[id number]] Collected:  07/30/18 1707    Lab Status:   In process Specimen:  Blood from Arm, Left Updated:  07/30/18 1848    Troponin I [[id number]]  (Normal) Collected: 07/30/18 1707    Lab Status:  Final result Specimen:  Blood from Arm, Left Updated:  07/30/18 1737     Troponin I <0 03 ng/mL     Lactic acid, plasma [[id number]]  (Normal) Collected:  07/30/18 1707    Lab Status:  Final result Specimen:  Blood from Arm, Left Updated:  07/30/18 1734     LACTIC ACID 1 3 mmol/L     Narrative:         Result may be elevated if tourniquet was used during collection  Basic metabolic panel [[id number]]  (Abnormal) Collected:  07/30/18 1707    Lab Status:  Final result Specimen:  Blood from Arm, Left Updated:  07/30/18 1734     Sodium 140 mmol/L      Potassium 4 0 mmol/L      Chloride 104 mmol/L      CO2 27 mmol/L      Anion Gap 9 mmol/L      BUN 27 (H) mg/dL      Creatinine 1 22 mg/dL      Glucose 111 (H) mg/dL      Calcium 9 5 mg/dL      eGFR 53 ml/min/1 73sq m     Narrative:         National Kidney Disease Education Program recommendations are as follows:  GFR calculation is accurate only with a steady state creatinine  Chronic Kidney disease less than 60 ml/min/1 73 sq  meters  Kidney failure less than 15 ml/min/1 73 sq  meters      Protime-INR [[id number]]  (Normal) Collected:  07/30/18 1707    Lab Status:  Final result Specimen:  Blood from Arm, Left Updated:  07/30/18 1729     Protime 12 2 seconds      INR 1 05    APTT [[id number]]  (Normal) Collected:  07/30/18 1707    Lab Status:  Final result Specimen:  Blood from Arm, Left Updated:  07/30/18 1729     PTT 31 seconds     CBC and differential [[id number]]  (Abnormal) Collected:  07/30/18 1707    Lab Status:  Final result Specimen:  Blood from Arm, Left Updated:  07/30/18 1718     WBC 9 70 Thousand/uL      RBC 4 38 Million/uL      Hemoglobin 13 7 (L) g/dL      Hematocrit 39 8 %      MCV 91 fL      MCH 31 3 pg      MCHC 34 5 g/dL      RDW 13 3 %      MPV 8 4 (L) fL      Platelets 891 Thousands/uL      nRBC 0 /100 WBCs      Neutrophils Relative 77 (H) %      Lymphocytes Relative 12 (L) %      Monocytes Relative 10 %      Eosinophils Relative 1 %      Basophils Relative 0 %      Neutrophils Absolute 7 50 (H) Thousands/µL      Lymphocytes Absolute 1 10 Thousands/µL      Monocytes Absolute 1 00 Thousand/µL      Eosinophils Absolute 0 10 Thousand/µL      Basophils Absolute 0 00 Thousands/µL     UA w Reflex to Microscopic w Reflex to Culture [[id number]]     Lab Status:  No result Specimen:  Urine                  XR chest 1 view   Final Result by Ingris Gould (07/30 1723)   Probable right upper lobe pneumonia  Recommend repeat imaging following   treatment to document resolution  Signed by Ingris Gould      CT head without contrast   Final Result by Keith Alford MD (07/30 1705)      1  There are no findings of intra or extra-axial hemorrhage, midline   shift or herniation  2   The MRI of the brain would be more sensitive examination and can be   considered for further evaluation with patient history of confusion              Signed by Keith Alford                 Procedures  Procedures       Phone Contacts  ED Phone Contact    ED Course  ED Course as of Jul 30 1921   Mon Jul 30, 2018   1756 MDM:  80 yr male with metastatic lung CA with acute change in MS and PNA - admit to tele    1757 D/w Tamara Lino - admitting APC - accepts to tele    1802 EKG - NSR 80bpm, RBBB, NSSTTW changes                                MDM  CritCare Time    Disposition  Final diagnoses:   Pneumonia of right upper lobe due to infectious organism [facility])   Altered mental status, unspecified altered mental status type   Primary malignant neoplasm of lung metastatic to other site, unspecified laterality [facility])     Time reflects when diagnosis was documented in both MDM as applicable and the Disposition within this note     Time User Action Codes Description Comment    7/30/2018  5:58 PM Joel Macias Add [J18 1] Pneumonia of right upper lobe due to infectious organism ([facility] [address] )     7/30/2018  5:58 PM Angel VOSS Add [R41 82] Altered mental status, unspecified altered mental status type     7/30/2018  5:58 PM Ricard Moritz A Add [C34 90] Primary malignant neoplasm of lung metastatic to other site, unspecified laterality [facility])       ED Disposition     ED Disposition Condition Comment    Admit  Case was discussed with Jazz Lopez - admitting PA  and the patient's admission status was agreed to be Admission Status: inpatient status to the service of Dr Sunday Hannon   Follow-up Information    None         Current Discharge Medication List      CONTINUE these medications which have NOT CHANGED    Details   ibuprofen (MOTRIN) 600 mg tablet Take 1 tablet (600 mg total) by mouth every 6 (six) hours as needed for mild pain  Qty: 90 tablet, Refills: 0    Associated Diagnoses: Pain      morphine 20 mg/mL concentrated solution 0 25 ml sl q 4 hours prn  Qty: 30 mL, Refills: 0    Associated Diagnoses: Malignant neoplasm of upper lobe of right lung (HCC)      PARoxetine (PAXIL) 20 mg tablet Take 1 tablet (20 mg total) by mouth daily  Qty: 90 tablet, Refills: 1    Associated Diagnoses: Depression, unspecified depression type      Psyllium (METAMUCIL) 28 3 % POWD Take by mouth as needed           No discharge procedures on file      ED Provider  Electronically Signed by           Ania Otero MD  07/30/18 0497

## 2018-07-31 LAB
ATRIAL RATE: 82 BPM
BACTERIA UR QL AUTO: ABNORMAL /HPF
BILIRUB UR QL STRIP: NEGATIVE
CLARITY UR: ABNORMAL
COLOR UR: YELLOW
GLUCOSE UR STRIP-MCNC: NEGATIVE MG/DL
HGB UR QL STRIP.AUTO: ABNORMAL
KETONES UR STRIP-MCNC: ABNORMAL MG/DL
LEUKOCYTE ESTERASE UR QL STRIP: ABNORMAL
MUCOUS THREADS UR QL AUTO: ABNORMAL
NITRITE UR QL STRIP: NEGATIVE
NON-SQ EPI CELLS URNS QL MICRO: ABNORMAL /HPF
P AXIS: 14 DEGREES
PH UR STRIP.AUTO: 5.5 [PH] (ref 5–8)
PR INTERVAL: 208 MS
PROT UR STRIP-MCNC: NEGATIVE MG/DL
QRS AXIS: -65 DEGREES
QRSD INTERVAL: 138 MS
QT INTERVAL: 420 MS
QTC INTERVAL: 490 MS
RBC #/AREA URNS AUTO: ABNORMAL /HPF
SP GR UR STRIP.AUTO: 1.02 (ref 1–1.03)
T WAVE AXIS: 7 DEGREES
UROBILINOGEN UR QL STRIP.AUTO: 0.2 E.U./DL
VENTRICULAR RATE: 82 BPM
WBC #/AREA URNS AUTO: ABNORMAL /HPF

## 2018-07-31 PROCEDURE — 87086 URINE CULTURE/COLONY COUNT: CPT | Performed by: EMERGENCY MEDICINE

## 2018-07-31 PROCEDURE — 81001 URINALYSIS AUTO W/SCOPE: CPT | Performed by: EMERGENCY MEDICINE

## 2018-07-31 RX ADMIN — AZITHROMYCIN FOR INJECTION INJECTION, POWDER, LYOPHILIZED, FOR SOLUTION 500 MG: 500 INJECTION INTRAVENOUS at 06:34

## 2018-07-31 RX ADMIN — CEFTRIAXONE 1000 MG: 1 INJECTION, SOLUTION INTRAVENOUS at 08:26

## 2018-07-31 RX ADMIN — HEPARIN SODIUM 5000 UNITS: 5000 INJECTION INTRAVENOUS; SUBCUTANEOUS at 21:34

## 2018-07-31 RX ADMIN — PSYLLIUM HUSK 1 PACKET: 3.4 POWDER ORAL at 09:27

## 2018-07-31 RX ADMIN — PAROXETINE 20 MG: 20 TABLET, FILM COATED ORAL at 21:34

## 2018-07-31 RX ADMIN — HEPARIN SODIUM 5000 UNITS: 5000 INJECTION INTRAVENOUS; SUBCUTANEOUS at 06:33

## 2018-07-31 RX ADMIN — ACETAMINOPHEN 650 MG: 325 TABLET ORAL at 14:39

## 2018-07-31 NOTE — PLAN OF CARE
Problem: DISCHARGE PLANNING - CARE MANAGEMENT  Goal: Discharge to post-acute care or home with appropriate resources  INTERVENTIONS:  - Conduct assessment to determine patient/family and health care team treatment goals, and need for post-acute services based on payer coverage, community resources, and patient preferences, and barriers to discharge  - Address psychosocial, clinical, and financial barriers to discharge as identified in assessment in conjunction with the patient/family and health care team  - Arrange appropriate level of post-acute services according to patient's   needs and preference and payer coverage in collaboration with the physician and health care team  - Communicate with and update the patient/family, physician, and health care team regarding progress on the discharge plan  - Arrange appropriate transportation to post-acute venues  - Patient and wife's goal is for pt to return home with home hospice care  Outcome: Progressing

## 2018-07-31 NOTE — ASSESSMENT & PLAN NOTE
Will admit to Medicine, given Zithromax 500 mg IV daily and Rocephin 1 g IV daily  Patient does have a history of lung cancer unsure what his treatment status is at this time will attempt to clarify with his wife  Will treat with Tylenol 650 mg p o  q 4 hours p r n  for minor pain or fever  Additionally will obtain sputum culture

## 2018-07-31 NOTE — NURSING NOTE
Patient states he's feeling fine and refuses to take any type of medication or treatment after being informed about current situation and need for treatment  PA notified and attempted to speak to patient  Patient continues to refuse  Will continue to monitor

## 2018-07-31 NOTE — CASE MANAGEMENT
Thank you,  Anupama Aqq  [address] Utilization Review Department  Phone: [phone]; Fax [phone]  ATTENTION: The Network Utilization Review Department is now centralized for our 9 Facilities  Make a note that we have a new phone and fax numbers for our Department  Please call with any questions or concerns to [phone] and carefully follow the prompts so that you are directed to the right person  All voicemails are confidential  Fax any determinations, approvals, denials, and requests for initial or continue stay review clinical to [phone]  Due to HIGH CALL volume, it would be easier if you could please send faxed requests to expedite your requests and in part, help us provide discharge notifications faster  Initial Clinical Review    Admission: Date/Time/Statement: INPATIENT ADMISSION 7/30/18 @ 1800     Orders Placed This Encounter   Procedures    Inpatient Admission (expected length of stay for this patient is greater than two midnights)     Standing Status:   Standing     Number of Occurrences:   1     Order Specific Question:   Admitting Physician     Answer:   Chantal Remedies [[id number]]     Order Specific Question:   Level of Care     Answer:   Med Surg [16]     Order Specific Question:   Estimated length of stay     Answer:   More than 2 Midnights     Order Specific Question:   Certification     Answer:   I certify that inpatient services are medically necessary for this patient for a duration of greater than two midnights  See H&P and MD Progress Notes for additional information about the patient's course of treatment           ED: Date/Time/Mode of Arrival:   ED Arrival Information     Expected Arrival Acuity Means of Arrival Escorted By Service Admission Type    - 7/30/2018 15:42 Urgent Ambulance [facility] General Medicine Urgent    Arrival Complaint    altered mental status          Chief Complaint:   Chief Complaint   Patient presents with    Altered Mental Status     pt  with stage 4 lung ca was diaphoretic 4 hours PTA, c/o "pain all over"  EMS arrived to find pt  confused x4    Pain     pt  reported chest pain, abdominal pain earlier today  denies any current pain  History of Illness: 20-year-old male was altered mental status since 2:00 p m  He patient initially did not know his wife now confused to the date  Patient was diagnosed with squamous cell metastatic lung CA earlier this month  ED Vital Signs:   ED Triage Vitals   Temperature Pulse Respirations Blood Pressure SpO2   07/30/18 1547 07/30/18 1547 07/30/18 1547 07/30/18 1547 07/30/18 1547   97 9 °F (36 6 °C) 78 18 127/62 95 %      Temp Source Heart Rate Source Patient Position - Orthostatic VS BP Location FiO2 (%)   07/30/18 1547 07/31/18 0659 07/30/18 1547 07/30/18 1547 --   Temporal Monitor Lying Right arm       Pain Score       07/30/18 1547       No Pain        Wt Readings from Last 1 Encounters:   07/31/18 96 5 kg (212 lb 11 9 oz)       Vital Signs (abnormal): none    Abnormal Labs/Diagnostic Test Results: 07/30/2018:  BUN 27, glucose 111, hgb 13 7, blood, urine cultures pending  Urinalysis:  Clarity, UA Slightly Cloudy     Leukocytes, UA 3+     Ketones, UA Trace     Blood, UA Trace-Intact     XR chest:Probable right upper lobe pneumonia   Recommend repeat imaging following  treatment to document resolution     EKG:Sinus rhythm with Premature atrial complexes  Right bundle branch block  Left anterior fascicular block    ED Treatment:   Medication Administration from 07/30/2018 1542 to 07/30/2018 1834       Date/Time Order Dose Route Action Action by Comments     07/30/2018 1825 sodium chloride 0 9 % bolus 1,000 mL 1,000 mL Intravenous [address] Kimmy Adorno RN      07/30/2018 1826 cefepime (MAXIPIME) IVPB (premix) 2,000 mg 2,000 mg Intravenous New Bag Kimmy Adorno RN           Past Medical/Surgical History:    Active Ambulatory Problems     Diagnosis Date Noted    Bifascicular block 03/02/2016    Dementia 06/19/2017    Generalized anxiety disorder 03/02/2016    Generalized osteoarthritis 03/02/2016    Hyperlipidemia 03/02/2016    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 [address] 03/02/2016    Palpitations 03/02/2016    Right bundle branch block (RBBB) 03/02/2016    Sleep disturbances 03/02/2016    Spinal stenosis 03/02/2016    Abnormal CT scan of lung 06/21/2018    Malignant neoplasm of upper lobe of right lung ([facility] [address] ) 07/24/2018     Resolved Ambulatory Problems     Diagnosis Date Noted    Cavitating mass in right upper lung lobe 06/11/2018     Past Medical History:   Diagnosis Date    Abnormal abdominal CT scan     [facility]     Cancer related pain 07/24/2018    Chronic cough     Constipation     Cough 07/24/2018    Decreased appetite 07/24/2018    Dementia     Dementia     Fatigue 07/24/2018    Pulmonary embolism (HCC) 1990s       Admitting Diagnosis: Altered mental status [R41 82]  Primary malignant neoplasm of lung metastatic to other site, unspecified laterality (HCC) [C34 90]  Altered mental status, unspecified altered mental status type [R41 82]  Pneumonia of right upper lobe due to infectious organism ([facility] ) [J18 1]    Age/Sex: 80 y o  male    Assessment/Plan:   CAP (community acquired pneumonia)   Assessment & Plan     Will admit to Medicine, given Zithromax 500 mg IV daily and Rocephin 1 g IV daily  Patient does have a history of lung cancer unsure what his treatment status is at this time will attempt to clarify with his wife  Will treat with Tylenol 650 mg p o  q 4 hours p r n  for minor pain or fever  Additionally will obtain sputum culture       Altered mental status, unspecified   Assessment & Plan     Unclear etiology is patient has no UA obtained at this time will obtain, additionally he is on chronic pain medications  Additionally he has an underlying pneumonia this is likely metabolic encephalopathy    Patient did have a negative CT of the head in the ER   Will treat with antibiotics for his community-acquired pneumonia and await UA results    Patient does have a history of dementia is unclear what his baseline mentation is will attempt sick contact wife by phone           Admission Orders:  Scheduled Meds:   Current Facility-Administered Medications:  acetaminophen 650 mg Oral Q6H PRN    azithromycin 500 mg Intravenous Q24H Last Rate: 500 mg (07/31/18 0634)   cefTRIAXone 1,000 mg Intravenous Q24H Last Rate: 1,000 mg (07/31/18 0826)   heparin (porcine) 5,000 Units Subcutaneous Q8H [address]    morphine 5 mg Oral Q4H PRN    PARoxetine 20 mg Oral HS    psyllium 1 packet Oral Daily      Continuous Infusions:    PRN Meds:   acetaminophen    morphine  Peripheral iv  24 hr tele  Continual observation  CBC & diff  CMP  Inpt to Hospice  Nasal cannula  Daily wts  Vitals routine

## 2018-07-31 NOTE — SOCIAL WORK
CM met with pt and wife Valentino Herrera at bedside  Patient confused, information obtained from wife  Patient resides with wife who assists him with ADL's  Patient diagnosed with lung cancer and does not want to pursue further treatment for same  CM discussed hospice care with wife at length  Wife agreeable to same  Wife states pt not typically confused as he is now and was previously in agreement with hospice care prior to this admission  Wife was given choices for hospice care and [facility]  [facility]bbie Price who placed eval and treat order  Possible discharge Thursday, wife reports she will transport pt home  CM will continue to follow  CM reviewed d/c planning process including the following: identifying help at home, patient preference for d/c planning needs, availability of treatment team to discuss questions or concerns patient and/or family may have regarding understanding medications and recognizing signs and symptoms once discharged  CM also encouraged patient to follow up with all recommended appointments after discharge  Patient advised of importance for patient and family to participate in managing patients medical well being

## 2018-07-31 NOTE — ASSESSMENT & PLAN NOTE
Unclear etiology is patient has no UA obtained at this time will obtain, additionally he is on chronic pain medications  Additionally he has an underlying pneumonia this is likely metabolic encephalopathy  Patient did have a negative CT of the head in the ER  Will treat with antibiotics for his community-acquired pneumonia and await UA results  Patient does have a history of dementia is unclear what his baseline mentation is will attempt sick contact wife by phone

## 2018-07-31 NOTE — H&P
H&P- Sancho Hernandez 11/4/1930, 80 y o  male MRN: [id number]    Unit/Bed#: -01 Encounter: [id number]    Primary Care Provider: Jeet Lopez DO   Date and time admitted to hospital: 7/30/2018  3:50 PM        * CAP (community acquired pneumonia)   Assessment & Plan    Will admit to Medicine, given Zithromax 500 mg IV daily and Rocephin 1 g IV daily  Patient does have a history of lung cancer unsure what his treatment status is at this time will attempt to clarify with his wife  Will treat with Tylenol 650 mg p o  q 4 hours p r n  for minor pain or fever  Additionally will obtain sputum culture  Altered mental status, unspecified   Assessment & Plan    Unclear etiology is patient has no UA obtained at this time will obtain, additionally he is on chronic pain medications  Additionally he has an underlying pneumonia this is likely metabolic encephalopathy  Patient did have a negative CT of the head in the ER  Will treat with antibiotics for his community-acquired pneumonia and await UA results  Patient does have a history of dementia is unclear what his baseline mentation is will attempt sick contact wife by phone  VTE Prophylaxis: Heparin  Code Status: DNR  POLST: There is no POLST form on file for this patient (pre-hospital)  Discussion with family: Spoke with wife Artis Alpers by phone extensively     Anticipated Length of Stay:  Patient will be admitted on an Inpatient basis with an anticipated length of stay of  > 2 midnights  Justification for Hospital Stay: CAP and delirium  Total Time for Visit, including Counseling / Coordination of Care: 45 minutes  Greater than 50% of this total time spent on direct patient counseling and coordination of care  Chief Complaint:  Confusion x1 day    History of Present Illness:    Sancho Hernandez is a 80 y o  male who presents with confusion x1 day  Patient is pleasant resting comfortably in bed at time of exam he offers no complaints  Patient is unsure why he is here I asked him if it was okay if I spoke to his wife diet and by phone to ascertain some more history which he okayed to verbally  As per discussion with wife Geovanna Patton patient has a history of metastatic lung cancer which she had a bronchoscopy done 3 weeks ago as well as a PET scan 2 weeks ago he has been seen by Oncology as well as pulmonary and he is receiving no treatment for same at this time  Patient's wife states that they advised Oncology that he does not wish treatment for his underlying lung cancer that he just wishes to be made comfortable  Patient has a pending appointment with hospice  As per patient's wife he was recently started on morphine and 0 25 mL q 4 hours p r n  approximately 3 days ago for chronic shoulder pain  He received his normal dose this morning at 0700 and at approximately 1300 today he had an episode where he started the wall for approximately an hour  Patient's wife denied any shortness of breath no cough no fever chills no urinary complaints include hematuria dysuria or increased urinary frequency  After a long conversation on the phone with wife she is amenable to being seen by hospice  Review of Systems:  Review of Systems   Constitutional: Negative for chills and fever  Respiratory: Negative for cough, chest tightness and shortness of breath  Cardiovascular: Positive for leg swelling  Negative for chest pain  Gastrointestinal: Negative for diarrhea, nausea and vomiting  All other systems reviewed and are negative        Past Medical and Surgical History:   Past Medical History:   Diagnosis Date    Abnormal abdominal CT scan     right lung    Cancer ([facility] [address] )     bladder    Cancer related pain 07/24/2018    upper right chest    Chronic cough     Constipation     on and off for 6 months    Cough 07/24/2018    for about 6-8 weeks    Decreased appetite 07/24/2018    about 6 months    Dementia     Dementia     Fatigue 07/24/2018 3 or 4 months    Pulmonary embolism ([facility] [address] ) 1990s       Past Surgical History:   Procedure Laterality Date    APPENDECTOMY      CATARACT EXTRACTION      COLONOSCOPY      CYSTOSCOPY      With biopsy, Last Assessed:  3/2/16    FL BRONCHOSCOPY,DIAGNOSTIC N/A 7/6/2018    Procedure: BRONCHOSCOPY FLEXIBLE;  Surgeon: Tio Hurst MD;  Location:  GI LAB; Service: Pulmonary    TONSILECTOMY AND ADNOIDECTOMY      VARICOSE VEIN SURGERY      Ligation       Meds/Allergies:  Prior to Admission medications    Medication Sig Start Date End Date Taking? Authorizing Provider   ibuprofen (MOTRIN) 600 mg tablet Take 1 tablet (600 mg total) by mouth every 6 (six) hours as needed for mild pain 5/31/18  Yes Tal Saunders DO   morphine 20 mg/mL concentrated solution 0 25 ml sl q 4 hours prn  7/27/18  Yes Tal Saunders DO   PARoxetine (PAXIL) 20 mg tablet Take 1 tablet (20 mg total) by mouth daily  Patient taking differently: Take 20 mg by mouth daily at bedtime   6/28/18  Yes Tal Saunders DO   Psyllium (METAMUCIL) 28 3 % POWD Take by mouth as needed    Historical Provider, MD     I have reviewed home medications with patient family member  Allergies:    Allergies   Allergen Reactions    Ciprofloxacin Palpitations and Rash       Social History:  Marital Status: /Civil Union   Occupation:  Retired   Patient Pre-hospital Living Situation:  Resides at home with wife  Patient Pre-hospital Level of Mobility:   Ambulate without assistance  Patient Pre-hospital Diet Restrictions:  None  Substance Use History:     History   Alcohol Use    8 4 oz/week    14 Cans of beer per week     Comment: 2 beers daily     History   Smoking Status    Former Smoker    Packs/day: 1 00    Years: 30 00    Quit date: 1988   Smokeless Tobacco    Never Used     History   Drug Use No       Family History:  I have reviewed the patients family history    Physical Exam:   Vitals:   Blood Pressure: 136/64 (07/30/18 1841)  Pulse: 84 (07/30/18 1841)  Temperature: 98 °F (36 7 °C) (07/30/18 1841)  Temp Source: Tympanic (07/30/18 1841)  Respirations: 16 (07/30/18 1841)  Height: 5' 9" (175 3 cm) (07/30/18 1841)  Weight - Scale: 99 2 kg (218 lb 11 1 oz) (07/30/18 1841)  SpO2: 94 % (07/30/18 1841)    Physical Exam   Constitutional: He is oriented to person, place, and time  He appears well-developed and well-nourished  HENT:   Head: Normocephalic and atraumatic  Mouth/Throat: No oropharyngeal exudate  Eyes: EOM are normal  Pupils are equal, round, and reactive to light  No scleral icterus  Neck: Normal range of motion  Neck supple  No JVD present  Cardiovascular: Normal rate, regular rhythm and normal heart sounds  No murmur heard  Pulmonary/Chest: Effort normal and breath sounds normal  No respiratory distress  He has no wheezes  He has no rales  Abdominal: Bowel sounds are normal  There is no tenderness  There is no rebound and no guarding  Musculoskeletal: Normal range of motion  He exhibits edema  2+ edema left lower extremity which he states is chronic and without calf tenderness to palpation Homans sign negative   Lymphadenopathy:     He has no cervical adenopathy  Neurological: He is alert and oriented to person, place, and time  Skin: Skin is warm and dry  No rash noted  No erythema  Psychiatric: He has a normal mood and affect  His behavior is normal    Nursing note and vitals reviewed  Additional Data:   Lab Results: I have personally reviewed pertinent reports          Results from last 7 days  Lab Units 07/30/18  1707   WBC Thousand/uL 9 70   HEMOGLOBIN g/dL 13 7*   HEMATOCRIT % 39 8   PLATELETS Thousands/uL 296   NEUTROS PCT % 77*   LYMPHS PCT % 12*   MONOS PCT % 10   EOS PCT % 1       Results from last 7 days  Lab Units 07/30/18  1707   SODIUM mmol/L 140   POTASSIUM mmol/L 4 0   CHLORIDE mmol/L 104   CO2 mmol/L 27   BUN mg/dL 27*   CREATININE mg/dL 1 22   CALCIUM mg/dL 9 5   GLUCOSE RANDOM mg/dL 111*       Results from last 7 days  Lab Units 07/30/18  1707   INR  1 05               Imaging: I have personally reviewed pertinent reports  XR chest 1 view   Final Result by Gregorio Garriod (07/30 6148)   Probable right upper lobe pneumonia  Recommend repeat imaging following   treatment to document resolution  Signed by Gregorio Garrido      CT head without contrast   Final Result by Trisha Apley, MD (07/30 1701)      1  There are no findings of intra or extra-axial hemorrhage, midline   shift or herniation  2   The MRI of the brain would be more sensitive examination and can be   considered for further evaluation with patient history of confusion  Signed by Trisha Apley          EKG, Pathology, and Other Studies Reviewed on Admission:   Ct Head Without Contrast    Result Date: 7/30/2018  Narrative: INDICATION: Confusion  ORDERING PROVIDER:  MATTIE COHEN  TECHNIQUE:  CT of the brain was performed without contrast   Automated mA/kV exposure control was utilized and patient examination was performed in strict accordance with principles of ALARA  RADIATION AMOUNT:  887 40 mGy-cm  COMPARISON:  None available  FINDINGS: The ventricles and extra ventricular spaces are dilated representing cerebral atrophy  There are periventricular white matter changes  There are no findings of intra or extra-axial hemorrhage, midline shift or herniation  The visualized sinuses are clear  The mastoid air cells are clear  Impression: 1  There are no findings of intra or extra-axial hemorrhage, midline shift or herniation  2   The MRI of the brain would be more sensitive examination and can be considered for further evaluation with patient history of confusion  Signed by Trisha Apley    Xr Chest 1 View    Result Date: 7/30/2018  Narrative: INDICATION:  Altered mental status  ORDERING PROVIDER:  MATTIE COHEN  TECHNIQUE:  Frontal chest was obtained at 16:40 hours  COMPARISON:  None Available   FINDINGS:  The cardiomediastinal silhouette is normal in size  There is increased opacity within the right upper lobe concerning for pneumonia  There are no pleural effusions  There is no pneumothorax  No acute osseous process  Impression: Probable right upper lobe pneumonia  Recommend repeat imaging following treatment to document resolution  Signed by Ace Callejas/[facility] Records Reviewed: Yes     ** Please Note: This note has been constructed using a voice recognition system   **

## 2018-08-01 LAB — BACTERIA UR CULT: NORMAL

## 2018-08-01 PROCEDURE — 99233 SBSQ HOSP IP/OBS HIGH 50: CPT | Performed by: INTERNAL MEDICINE

## 2018-08-01 RX ADMIN — HEPARIN SODIUM 5000 UNITS: 5000 INJECTION INTRAVENOUS; SUBCUTANEOUS at 21:30

## 2018-08-01 RX ADMIN — AZITHROMYCIN FOR INJECTION INJECTION, POWDER, LYOPHILIZED, FOR SOLUTION 500 MG: 500 INJECTION INTRAVENOUS at 06:34

## 2018-08-01 RX ADMIN — PAROXETINE 20 MG: 20 TABLET, FILM COATED ORAL at 21:30

## 2018-08-01 RX ADMIN — CEFTRIAXONE 1000 MG: 1 INJECTION, SOLUTION INTRAVENOUS at 08:40

## 2018-08-01 RX ADMIN — HEPARIN SODIUM 5000 UNITS: 5000 INJECTION INTRAVENOUS; SUBCUTANEOUS at 06:34

## 2018-08-01 RX ADMIN — HEPARIN SODIUM 5000 UNITS: 5000 INJECTION INTRAVENOUS; SUBCUTANEOUS at 13:38

## 2018-08-01 NOTE — SOCIAL WORK
Discussed the POC with the interdisciplinary team   Pt is a 1:1 d/t impulsivity  Pt has been evaluated by [facility] and will begin services upon discharge to home  Spoke to pt wife Artis Alpers who reports as long as she has help getting him in the car she will be able to transport home  She does have someone at home to help get him  Into the house  Will most like be discharged tomorrow with [facility]

## 2018-08-01 NOTE — HOSPICE NOTE
Met with pt wife Hema Wallace 7/31  She is agreeable to hospice care upon discharge  Pt does not qualify for inpatient hospice upon evaluation  Is approved for Home hospice by Dr Ted Arango  Update to Brett will admit to services upon DC home    Thank you for the referral

## 2018-08-01 NOTE — PROGRESS NOTES
[facility] Progress Note  Patient: Leslie Case 80 y o  male   MRN: [id number]  PCP: Whit Solorzano DO  Unit/Bed#: -Robert Encounter: [id number]  Date Of Visit: 18    Assessment:    Principal Problem:    CAP (community acquired pneumonia)  Active Problems:    Altered mental status, unspecified    Pneumonia of right upper lobe due to infectious organism [facility])      Plan:  * CAP (community acquired pneumonia)   Assessment & Plan     Continue current antibiotic regimen  1 of 2 bottles currently growing quad negative Staphylococcus possibly due to contaminant  Patient tolerating Azithromycin and Rocephin while  Clinically improving  Likely will require 72 hours of IV antibiotics          Altered mental status, unspecified   Assessment & Plan     Possibly due to delirium  Wife is currently by bedside reports improving symptoms     3  History of lung cancer  Patient's wife reports  He was seen by oncologist recently who told him  "The PET scan do not look good You have got cancer that has spread to both lungs now "  For these reasons wife is requested by care hospice evaluation  VTE Pharmacologic Prophylaxis:   Pharmacologic: Heparin  Mechanical VTE Prophylaxis in Place: Yes    Patient Centered Rounds: I have performed bedside rounds with nursing staff today  Discussions with Specialists or Other Care Team Provider: yes    Education and Discussions with Family / Patient: yes    Time Spent for Care: 45 minutes  More than 50% of total time spent on counseling and coordination of care as described above      Current Length of Stay: 2 day(s)    Current Patient Status: Inpatient   Certification Statement: The patient will continue to require additional inpatient hospital stay due to Pneumonia    Discharge Plan / Estimated Discharge Date: 39    Code Status: Level 3 - DNAR and DNI      Subjective:   Feeling better today    Objective:     Vitals:   Temp (24hrs), Av 7 °F (37 1 °C), Min:98 2 °F (36 8 °C), Max:99 1 °F (37 3 °C)    HR:  [72-87] 87  Resp:  [18] 18  BP: (139-149)/(76-86) 149/86  SpO2:  [97 %-98 %] 97 %  Body mass index is 31 25 kg/m²  Input and Output Summary (last 24 hours): Intake/Output Summary (Last 24 hours) at 08/01/18 1740  Last data filed at 08/01/18 1713   Gross per 24 hour   Intake             1740 ml   Output              500 ml   Net             1240 ml       Physical Exam:     Physical Exam    GENERAL APPEARANCE: WD/WN in NAD pleasant  SKIN: no rash  HEENT: NC/AT, PERRLA (B), moist MM, no epistaxis  NECK: Supple, no JVD    LUNGS: CTA (B) mildly prolonged expiratory phase,   no use of accessory muscles  HEART:          S1S 2, RRR  , PMI is not displaced  ABDOMEN: Soft, nontender, nondistended, +BS  Rectal exam:  EXTREMITIES: no edema   PERIPHERAL VASCULAR: palpable pulses   NEURO:  AAO x 3, CN 2-12: non focal  MUSCLE STRENGHT: 5/5 (B), SENSATION: nonfocal  DTR: ++, CEREBELLAR: non focal  Patient does endorse not wanting to be in the hospital and discussing about palliative care and hospice at home  I had Further discussion about palliative care and assured him is currently not in danger immediately die there  Additional Data:     Labs:      Results from last 7 days  Lab Units 07/30/18  1707   WBC Thousand/uL 9 70   HEMOGLOBIN g/dL 13 7*   HEMATOCRIT % 39 8   PLATELETS Thousands/uL 296   NEUTROS PCT % 77*   LYMPHS PCT % 12*   MONOS PCT % 10   EOS PCT % 1       Results from last 7 days  Lab Units 07/30/18  1707   SODIUM mmol/L 140   POTASSIUM mmol/L 4 0   CHLORIDE mmol/L 104   CO2 mmol/L 27   BUN mg/dL 27*   CREATININE mg/dL 1 22   CALCIUM mg/dL 9 5   GLUCOSE RANDOM mg/dL 111*       Results from last 7 days  Lab Units 07/30/18  1707   INR  1 05       * I Have Reviewed All Lab Data Listed Above  * Additional Pertinent Lab Tests Reviewed:  All Labs For Current Hospital Admission Reviewed    Imaging:    Imaging Reports Reviewed Today Include: yes  Imaging Personally Reviewed by Myself Includes:  yes    Recent Cultures (last 7 days):       Results from last 7 days  Lab Units 07/31/18  0633 07/30/18  1750 07/30/18  1707   BLOOD CULTURE   --  No Growth at 24 hrs  Staphylococcus coagulase negative*   GRAM STAIN RESULT   --   --  Gram positive cocci in clusters   URINE CULTURE  No Growth <1000 cfu/mL  --   --        Last 24 Hours Medication List:     Current Facility-Administered Medications:  acetaminophen 650 mg Oral Q6H PRN Floria Sevin, PA-C    azithromycin 500 mg Intravenous Q24H Floria Sevin, PA-C Last Rate: 500 mg (08/01/18 7767)   cefTRIAXone 1,000 mg Intravenous Q24H Floria Sevin, PA-C Last Rate: 1,000 mg (08/01/18 0217)   heparin (porcine) 5,000 Units Subcutaneous [facility] Florpratibha Carrerain, PA-C    morphine 5 mg Oral Q4H PRN Floria Sevin, PA-C    PARoxetine 20 mg Oral HS Floria Sevin, PA-C    psyllium 1 packet Oral Daily Luke Raman, PA-C         Today, Patient Was Seen By: Berta Sawyer    ** Please Note: This note has been constructed using a voice recognition system   **

## 2018-08-02 VITALS
BODY MASS INDEX: 31.35 KG/M2 | DIASTOLIC BLOOD PRESSURE: 70 MMHG | WEIGHT: 211.64 LBS | HEIGHT: 69 IN | RESPIRATION RATE: 18 BRPM | OXYGEN SATURATION: 92 % | SYSTOLIC BLOOD PRESSURE: 130 MMHG | HEART RATE: 75 BPM | TEMPERATURE: 98.6 F

## 2018-08-02 LAB
BACTERIA BLD CULT: ABNORMAL
GRAM STN SPEC: ABNORMAL

## 2018-08-02 PROCEDURE — 99239 HOSP IP/OBS DSCHRG MGMT >30: CPT | Performed by: INTERNAL MEDICINE

## 2018-08-02 RX ORDER — AMOXICILLIN AND CLAVULANATE POTASSIUM 875; 125 MG/1; MG/1
1 TABLET, FILM COATED ORAL EVERY 12 HOURS SCHEDULED
Qty: 10 TABLET | Refills: 0 | Status: SHIPPED | OUTPATIENT
Start: 2018-08-02 | End: 2018-08-07

## 2018-08-02 RX ADMIN — AZITHROMYCIN FOR INJECTION INJECTION, POWDER, LYOPHILIZED, FOR SOLUTION 500 MG: 500 INJECTION INTRAVENOUS at 05:49

## 2018-08-02 RX ADMIN — CEFTRIAXONE 1000 MG: 1 INJECTION, SOLUTION INTRAVENOUS at 08:38

## 2018-08-02 RX ADMIN — PSYLLIUM HUSK 1 PACKET: 3.4 POWDER ORAL at 10:11

## 2018-08-02 NOTE — PLAN OF CARE
Problem: DISCHARGE PLANNING - CARE MANAGEMENT  Goal: Discharge to post-acute care or home with appropriate resources  INTERVENTIONS:  - Conduct assessment to determine patient/family and health care team treatment goals, and need for post-acute services based on payer coverage, community resources, and patient preferences, and barriers to discharge  - Address psychosocial, clinical, and financial barriers to discharge as identified in assessment in conjunction with the patient/family and health care team  - Arrange appropriate level of post-acute services according to patient's   needs and preference and payer coverage in collaboration with the physician and health care team  - Communicate with and update the patient/family, physician, and health care team regarding progress on the discharge plan  - Arrange appropriate transportation to post-acute venues  - Patient and wife's goal is for pt to return home with home hospice care   Outcome: Completed Date Met: 08/02/18

## 2018-08-02 NOTE — PLAN OF CARE
Problem: SAFETY ADULT  Goal: Patient will remain free of falls  INTERVENTIONS:  - Assess patient frequently for physical needs  -  Identify cognitive and physical deficits and behaviors that affect risk of falls  -  [place] fall precautions as indicated by assessment   - Educate patient/family on patient safety including physical limitations  - Instruct patient to call for assistance with activity based on assessment  - Modify environment to reduce risk of injury  - Consider OT/PT consult to assist with strengthening/mobility   Outcome: Progressing  Patient maintained under constant 1:1 observation for safety/fall prevention  Patient pleasant and cooperative with staff  Outcome:  Patient will remain free from falls

## 2018-08-02 NOTE — DISCHARGE SUMMARY
Discharge Summary - James Brady 80 y o  male MRN: [id number]    Unit/Bed#: -01 Encounter: [id number]    Admission Date:   Admission Orders     Ordered        07/30/18 1800  Inpatient Admission (expected length of stay for this patient is greater than two midnights)  Once               Admitting Diagnosis: Altered mental status [R41 82]  Primary malignant neoplasm of lung metastatic to other site, unspecified laterality ([facility] [address] ) [C34 90]  Altered mental status, unspecified altered mental status type [R41 82]  Pneumonia of right upper lobe due to infectious organism [facility]) [J18 1]         Procedures Performed: No orders of the defined types were placed in this encounter  Summary of Hospital Course: James Brady is a 80 y o  male who presents with confusion x1 day  Patient is pleasant resting comfortably in bed at time of exam he offers no complaints  Patient is unsure why he is here I asked him if it was okay if I spoke to his wife diet and by phone to ascertain some more history which he okayed to verbally  As per discussion with wife Jez Vazquez patient has a history of metastatic lung cancer which she had a bronchoscopy done 3 weeks ago as well as a PET scan 2 weeks ago he has been seen by Oncology as well as pulmonary and he is receiving no treatment for same at this time  Patient's wife states that they advised Oncology that he does not wish treatment for his underlying lung cancer that he just wishes to be made comfortable  Patient has a pending appointment with hospice    As per patient's wife he was recently started on morphine and 0 25 mL q 4 hours p r n  approximately 3 days ago for chronic shoulder pain  He received his normal dose this morning at 0700 and at approximately 1300 today he had an episode where he started the wall for approximately an hour    Patient's wife denied any shortness of breath no cough no fever chills no urinary complaints include hematuria dysuria or increased urinary frequency  He was subsequently admitted for treatment of pneumonia  Initially treated with Rocephin and azithromycin  Unfortunately CT head shows patient has multiple lytic lesions    After a long conversation on the phone with wife she is amenable to being seen by hospice  Patient was subsequently switch to p o  Augmentin and discharged home in stable condition with palliative care and hospice        Significant Findings, Care, Treatment and Services Provided:  As per description, IV antibiotics including Rocephin and azithromycin  Complications:  No known complications    Discharge Diagnosis:   Pneumonia  Respiratory distress  Stage IV lung cancer  Delirium/altered mentation due to pneumonia  Resolved Problems  Date Reviewed: 7/30/2018    None          Condition at Discharge: fair         Discharge instructions/Information to patient and family:   See after visit summary for information provided to patient and family  Provisions for Follow-Up Care:  See after visit summary for information related to follow-up care and any pertinent home health orders  PCP: Lanie Lundberg DO    Disposition: Home    Planned Readmission: No    Discharge Statement   I spent 45 minutes discharging the patient  This time was spent on the day of discharge  I had direct contact with the patient on the day of discharge  Additional documentation is required if more than 30 minutes were spent on discharge  Discharge Medications:  See after visit summary for reconciled discharge medications provided to patient and family

## 2018-08-04 LAB — BACTERIA BLD CULT: NORMAL

## 2018-08-05 ENCOUNTER — TELEPHONE (OUTPATIENT)
Dept: PALLIATIVE MEDICINE | Facility: CLINIC | Age: 83
End: 2018-08-05

## 2018-08-05 DIAGNOSIS — F05 ACUTE HYPERACTIVE DELIRIUM DUE TO ANOTHER MEDICAL CONDITION: Primary | ICD-10-CM

## 2018-08-05 DIAGNOSIS — F41.9 ANXIETY: Primary | ICD-10-CM

## 2018-08-05 DIAGNOSIS — C34.11 MALIGNANT NEOPLASM OF UPPER LOBE OF RIGHT LUNG (HCC): ICD-10-CM

## 2018-08-05 RX ORDER — LORAZEPAM 2 MG/ML
CONCENTRATE ORAL
Qty: 30 ML | Refills: 1 | Status: SHIPPED | OUTPATIENT
Start: 2018-08-05

## 2018-08-05 RX ORDER — MORPHINE SULFATE 100 MG/5ML
10 SOLUTION ORAL EVERY 2 HOUR PRN
Qty: 60 ML | Refills: 0 | Status: SHIPPED | OUTPATIENT
Start: 2018-08-05

## 2018-08-05 RX ORDER — LORAZEPAM 0.5 MG/1
TABLET ORAL
Qty: 20 TABLET | Refills: 0 | Status: SHIPPED | OUTPATIENT
Start: 2018-08-05

## 2018-08-05 RX ORDER — HALOPERIDOL 2 MG/ML
SOLUTION ORAL
Qty: 60 ML | Refills: 1 | Status: SHIPPED | OUTPATIENT
Start: 2018-08-05

## 2018-08-05 RX ORDER — QUETIAPINE FUMARATE 25 MG/1
25 TABLET, FILM COATED ORAL
Qty: 10 TABLET | Refills: 1 | Status: SHIPPED | OUTPATIENT
Start: 2018-08-05

## 2018-08-05 NOTE — TELEPHONE ENCOUNTER
Home hospice pt presented to on-call line with agitated delirium and persecutory delusions  Has responded well to antipsychotics; will begin to transition to scheduled quetiapine  For now, haldol scheduled will be used to promote safety    Other comfort tonia meds refilled per on-call RN request

## 2018-08-06 LAB
FUNGUS SPEC CULT: NORMAL
FUNGUS SPEC CULT: NORMAL

## 2018-08-06 NOTE — CASE MANAGEMENT
Notification of Discharge  This is a Notification of Discharge from our facility [address]  Please be advised that this patient has been discharge from our facility  Below you will find the admission and discharge date and time including the patients disposition  PRESENTATION DATE: 7/30/2018  3:50 PM  IP ADMISSION DATE: 7/30/18 1800  DISCHARGE DATE: 8/2/2018  1:09 PM  DISPOSITION: [address] [facility] in the [facility] Utilization Review Department  Phone: [phone]; Fax [phone]  ATTENTION: The Network Utilization Review Department is now centralized for our 9 Facilities  Make a note that we have a new phone and fax numbers for our Department  Please call with any questions or concerns to [phone] and carefully follow the prompts so that you are directed to the right person  All voicemails are confidential  Fax any determinations, approvals, denials, and requests for initial or continue stay review clinical to [phone]  Due to HIGH CALL volume, it would be easier if you could please send faxed requests to expedite your requests and in part, help us provide discharge notifications faster

## 2018-08-07 ENCOUNTER — TELEPHONE (OUTPATIENT)
Dept: INTERNAL MEDICINE CLINIC | Facility: CLINIC | Age: 83
End: 2018-08-07

## 2018-08-07 NOTE — TELEPHONE ENCOUNTER
Shola Mcmillan called to update you on condition    Heri Mcguire had a bad weekend, terminal restlessness  The Morphine and lorazapam was not working  They Cath him and got 400 ml anastasia urine  His wife request a morphine pump, Dr Joce Wisdom was called and approved the pump  He will receive 1 mg per hour  If you need to have anything else done   Please contact Shola Mcmillan    [phone]

## 2018-08-08 ENCOUNTER — PATIENT OUTREACH (OUTPATIENT)
Dept: INTERNAL MEDICINE CLINIC | Facility: CLINIC | Age: 83
End: 2018-08-08

## 2018-08-21 LAB
MYCOBACTERIUM SPEC CULT: NORMAL
MYCOBACTERIUM SPEC CULT: NORMAL
RHODAMINE-AURAMINE STN SPEC: NORMAL
RHODAMINE-AURAMINE STN SPEC: NORMAL